# Patient Record
Sex: FEMALE | Race: WHITE | Employment: OTHER | URBAN - METROPOLITAN AREA
[De-identification: names, ages, dates, MRNs, and addresses within clinical notes are randomized per-mention and may not be internally consistent; named-entity substitution may affect disease eponyms.]

---

## 2019-04-16 ENCOUNTER — RECORDS - HEALTHEAST (OUTPATIENT)
Dept: LAB | Facility: CLINIC | Age: 84
End: 2019-04-16

## 2019-04-16 LAB
ALBUMIN SERPL-MCNC: 3 G/DL (ref 3.5–5)
ALP SERPL-CCNC: 76 U/L (ref 45–120)
ALT SERPL W P-5'-P-CCNC: <9 U/L (ref 0–45)
ANION GAP SERPL CALCULATED.3IONS-SCNC: 8 MMOL/L (ref 5–18)
AST SERPL W P-5'-P-CCNC: 14 U/L (ref 0–40)
BASOPHILS # BLD AUTO: 0.1 THOU/UL (ref 0–0.2)
BASOPHILS NFR BLD AUTO: 1 % (ref 0–2)
BILIRUB SERPL-MCNC: 0.3 MG/DL (ref 0–1)
BUN SERPL-MCNC: 93 MG/DL (ref 8–28)
CALCIUM SERPL-MCNC: 9.5 MG/DL (ref 8.5–10.5)
CHLORIDE BLD-SCNC: 105 MMOL/L (ref 98–107)
CO2 SERPL-SCNC: 24 MMOL/L (ref 22–31)
CREAT SERPL-MCNC: 2.72 MG/DL (ref 0.6–1.1)
EOSINOPHIL # BLD AUTO: 0.2 THOU/UL (ref 0–0.4)
EOSINOPHIL NFR BLD AUTO: 3 % (ref 0–6)
ERYTHROCYTE [DISTWIDTH] IN BLOOD BY AUTOMATED COUNT: 14.6 % (ref 11–14.5)
GFR SERPL CREATININE-BSD FRML MDRD: 16 ML/MIN/1.73M2
GLUCOSE BLD-MCNC: 113 MG/DL (ref 70–125)
HCT VFR BLD AUTO: 33.3 % (ref 35–47)
HGB BLD-MCNC: 10.3 G/DL (ref 12–16)
LYMPHOCYTES # BLD AUTO: 1.1 THOU/UL (ref 0.8–4.4)
LYMPHOCYTES NFR BLD AUTO: 12 % (ref 20–40)
MCH RBC QN AUTO: 29.8 PG (ref 27–34)
MCHC RBC AUTO-ENTMCNC: 30.9 G/DL (ref 32–36)
MCV RBC AUTO: 96 FL (ref 80–100)
MONOCYTES # BLD AUTO: 1 THOU/UL (ref 0–0.9)
MONOCYTES NFR BLD AUTO: 10 % (ref 2–10)
NEUTROPHILS # BLD AUTO: 6.9 THOU/UL (ref 2–7.7)
NEUTROPHILS NFR BLD AUTO: 75 % (ref 50–70)
PLATELET # BLD AUTO: 173 THOU/UL (ref 140–440)
PMV BLD AUTO: 12.5 FL (ref 8.5–12.5)
POTASSIUM BLD-SCNC: 5.3 MMOL/L (ref 3.5–5)
PROT SERPL-MCNC: 5.9 G/DL (ref 6–8)
RBC # BLD AUTO: 3.46 MILL/UL (ref 3.8–5.4)
SODIUM SERPL-SCNC: 137 MMOL/L (ref 136–145)
TSH SERPL DL<=0.005 MIU/L-ACNC: 1.45 UIU/ML (ref 0.3–5)
WBC: 9.4 THOU/UL (ref 4–11)

## 2019-04-18 ENCOUNTER — RECORDS - HEALTHEAST (OUTPATIENT)
Dept: LAB | Facility: CLINIC | Age: 84
End: 2019-04-18

## 2019-04-18 LAB
ALBUMIN UR-MCNC: NEGATIVE MG/DL
APPEARANCE UR: ABNORMAL
BACTERIA #/AREA URNS HPF: ABNORMAL HPF
BILIRUB UR QL STRIP: NEGATIVE
COLOR UR AUTO: YELLOW
GLUCOSE UR STRIP-MCNC: NEGATIVE MG/DL
HGB UR QL STRIP: ABNORMAL
KETONES UR STRIP-MCNC: NEGATIVE MG/DL
LEUKOCYTE ESTERASE UR QL STRIP: ABNORMAL
MUCOUS THREADS #/AREA URNS LPF: ABNORMAL LPF
NITRATE UR QL: NEGATIVE
PH UR STRIP: 5.5 [PH] (ref 4.5–8)
RBC #/AREA URNS AUTO: ABNORMAL HPF
SP GR UR STRIP: 1.01 (ref 1–1.03)
SQUAMOUS #/AREA URNS AUTO: >100 LPF
UROBILINOGEN UR STRIP-ACNC: ABNORMAL
WBC #/AREA URNS AUTO: >100 HPF
WBC CLUMPS #/AREA URNS HPF: PRESENT /[HPF]

## 2019-04-21 LAB — BACTERIA SPEC CULT: ABNORMAL

## 2019-04-23 ENCOUNTER — RECORDS - HEALTHEAST (OUTPATIENT)
Dept: LAB | Facility: CLINIC | Age: 84
End: 2019-04-23

## 2019-04-23 LAB
ANION GAP SERPL CALCULATED.3IONS-SCNC: 10 MMOL/L (ref 5–18)
BUN SERPL-MCNC: 44 MG/DL (ref 8–28)
CALCIUM SERPL-MCNC: 9.3 MG/DL (ref 8.5–10.5)
CHLORIDE BLD-SCNC: 103 MMOL/L (ref 98–107)
CO2 SERPL-SCNC: 23 MMOL/L (ref 22–31)
CREAT SERPL-MCNC: 1.68 MG/DL (ref 0.6–1.1)
GFR SERPL CREATININE-BSD FRML MDRD: 28 ML/MIN/1.73M2
GLUCOSE BLD-MCNC: 166 MG/DL (ref 70–125)
POTASSIUM BLD-SCNC: 4 MMOL/L (ref 3.5–5)
SODIUM SERPL-SCNC: 136 MMOL/L (ref 136–145)

## 2019-04-24 ENCOUNTER — APPOINTMENT (OUTPATIENT)
Dept: GENERAL RADIOLOGY | Facility: CLINIC | Age: 84
DRG: 190 | End: 2019-04-24
Attending: EMERGENCY MEDICINE
Payer: MEDICARE

## 2019-04-24 ENCOUNTER — HOSPITAL ENCOUNTER (INPATIENT)
Facility: CLINIC | Age: 84
LOS: 4 days | Discharge: NURSING, REHAB OR LONG-TERM CARE | DRG: 190 | End: 2019-04-28
Attending: EMERGENCY MEDICINE | Admitting: INTERNAL MEDICINE
Payer: MEDICARE

## 2019-04-24 DIAGNOSIS — I48.20 CHRONIC ATRIAL FIBRILLATION (H): ICD-10-CM

## 2019-04-24 DIAGNOSIS — J18.9 PNEUMONIA DUE TO INFECTIOUS ORGANISM, UNSPECIFIED LATERALITY, UNSPECIFIED PART OF LUNG: ICD-10-CM

## 2019-04-24 DIAGNOSIS — J45.901 EXACERBATION OF ASTHMA, UNSPECIFIED ASTHMA SEVERITY, UNSPECIFIED WHETHER PERSISTENT: Primary | ICD-10-CM

## 2019-04-24 DIAGNOSIS — J18.9 COMMUNITY ACQUIRED PNEUMONIA, UNSPECIFIED LATERALITY: ICD-10-CM

## 2019-04-24 LAB
ALBUMIN SERPL-MCNC: 3 G/DL (ref 3.4–5)
ALP SERPL-CCNC: 121 U/L (ref 40–150)
ALT SERPL W P-5'-P-CCNC: 42 U/L (ref 0–50)
ANION GAP SERPL CALCULATED.3IONS-SCNC: 11 MMOL/L (ref 3–14)
AST SERPL W P-5'-P-CCNC: 43 U/L (ref 0–45)
BASOPHILS # BLD AUTO: 0 10E9/L (ref 0–0.2)
BASOPHILS NFR BLD AUTO: 0.1 %
BILIRUB SERPL-MCNC: 0.7 MG/DL (ref 0.2–1.3)
BUN SERPL-MCNC: 43 MG/DL (ref 7–30)
CALCIUM SERPL-MCNC: 8.9 MG/DL (ref 8.5–10.1)
CHLORIDE SERPL-SCNC: 104 MMOL/L (ref 94–109)
CO2 SERPL-SCNC: 22 MMOL/L (ref 20–32)
CREAT SERPL-MCNC: 1.62 MG/DL (ref 0.52–1.04)
DIFFERENTIAL METHOD BLD: ABNORMAL
EOSINOPHIL # BLD AUTO: 0 10E9/L (ref 0–0.7)
EOSINOPHIL NFR BLD AUTO: 0.4 %
ERYTHROCYTE [DISTWIDTH] IN BLOOD BY AUTOMATED COUNT: 14.9 % (ref 10–15)
GFR SERPL CREATININE-BSD FRML MDRD: 27 ML/MIN/{1.73_M2}
GLUCOSE SERPL-MCNC: 135 MG/DL (ref 70–99)
HCT VFR BLD AUTO: 35.1 % (ref 35–47)
HGB BLD-MCNC: 11.5 G/DL (ref 11.7–15.7)
IMM GRANULOCYTES # BLD: 0.1 10E9/L (ref 0–0.4)
IMM GRANULOCYTES NFR BLD: 0.6 %
LYMPHOCYTES # BLD AUTO: 0.7 10E9/L (ref 0.8–5.3)
LYMPHOCYTES NFR BLD AUTO: 6.1 %
MCH RBC QN AUTO: 30 PG (ref 26.5–33)
MCHC RBC AUTO-ENTMCNC: 32.8 G/DL (ref 31.5–36.5)
MCV RBC AUTO: 92 FL (ref 78–100)
MONOCYTES # BLD AUTO: 1.4 10E9/L (ref 0–1.3)
MONOCYTES NFR BLD AUTO: 12.4 %
NEUTROPHILS # BLD AUTO: 9.2 10E9/L (ref 1.6–8.3)
NEUTROPHILS NFR BLD AUTO: 80.4 %
NRBC # BLD AUTO: 0 10*3/UL
NRBC BLD AUTO-RTO: 0 /100
PLATELET # BLD AUTO: 227 10E9/L (ref 150–450)
POTASSIUM SERPL-SCNC: 4 MMOL/L (ref 3.4–5.3)
PROT SERPL-MCNC: 7.2 G/DL (ref 6.8–8.8)
RBC # BLD AUTO: 3.83 10E12/L (ref 3.8–5.2)
SODIUM SERPL-SCNC: 137 MMOL/L (ref 133–144)
TROPONIN I SERPL-MCNC: 0.02 UG/L (ref 0–0.04)
WBC # BLD AUTO: 11.4 10E9/L (ref 4–11)

## 2019-04-24 PROCEDURE — 94640 AIRWAY INHALATION TREATMENT: CPT

## 2019-04-24 PROCEDURE — 40000275 ZZH STATISTIC RCP TIME EA 10 MIN

## 2019-04-24 PROCEDURE — 99285 EMERGENCY DEPT VISIT HI MDM: CPT | Mod: 25

## 2019-04-24 PROCEDURE — 71046 X-RAY EXAM CHEST 2 VIEWS: CPT

## 2019-04-24 PROCEDURE — 25000125 ZZHC RX 250: Performed by: EMERGENCY MEDICINE

## 2019-04-24 PROCEDURE — 87040 BLOOD CULTURE FOR BACTERIA: CPT | Performed by: EMERGENCY MEDICINE

## 2019-04-24 PROCEDURE — 85025 COMPLETE CBC W/AUTO DIFF WBC: CPT | Performed by: EMERGENCY MEDICINE

## 2019-04-24 PROCEDURE — 96361 HYDRATE IV INFUSION ADD-ON: CPT

## 2019-04-24 PROCEDURE — 36415 COLL VENOUS BLD VENIPUNCTURE: CPT

## 2019-04-24 PROCEDURE — 96365 THER/PROPH/DIAG IV INF INIT: CPT

## 2019-04-24 PROCEDURE — 12000000 ZZH R&B MED SURG/OB

## 2019-04-24 PROCEDURE — 99223 1ST HOSP IP/OBS HIGH 75: CPT | Mod: AI | Performed by: INTERNAL MEDICINE

## 2019-04-24 PROCEDURE — 84484 ASSAY OF TROPONIN QUANT: CPT | Performed by: EMERGENCY MEDICINE

## 2019-04-24 PROCEDURE — A9270 NON-COVERED ITEM OR SERVICE: HCPCS | Performed by: INTERNAL MEDICINE

## 2019-04-24 PROCEDURE — 80053 COMPREHEN METABOLIC PANEL: CPT | Performed by: EMERGENCY MEDICINE

## 2019-04-24 PROCEDURE — 25000132 ZZH RX MED GY IP 250 OP 250 PS 637: Performed by: INTERNAL MEDICINE

## 2019-04-24 PROCEDURE — 25000128 H RX IP 250 OP 636: Performed by: EMERGENCY MEDICINE

## 2019-04-24 PROCEDURE — 25000128 H RX IP 250 OP 636: Performed by: INTERNAL MEDICINE

## 2019-04-24 PROCEDURE — 94640 AIRWAY INHALATION TREATMENT: CPT | Mod: 76

## 2019-04-24 PROCEDURE — 25000125 ZZHC RX 250: Performed by: INTERNAL MEDICINE

## 2019-04-24 RX ORDER — FUROSEMIDE 20 MG
20 TABLET ORAL
Status: ON HOLD | COMMUNITY
End: 2019-04-28

## 2019-04-24 RX ORDER — LATANOPROST 50 UG/ML
1 SOLUTION/ DROPS OPHTHALMIC AT BEDTIME
Status: DISCONTINUED | OUTPATIENT
Start: 2019-04-24 | End: 2019-04-28 | Stop reason: HOSPADM

## 2019-04-24 RX ORDER — ALBUTEROL SULFATE 90 UG/1
2 AEROSOL, METERED RESPIRATORY (INHALATION)
COMMUNITY

## 2019-04-24 RX ORDER — LOVASTATIN 20 MG
20 TABLET ORAL EVERY EVENING
COMMUNITY

## 2019-04-24 RX ORDER — ALBUTEROL SULFATE 0.83 MG/ML
2.5 SOLUTION RESPIRATORY (INHALATION) EVERY 6 HOURS PRN
COMMUNITY
End: 2019-04-24

## 2019-04-24 RX ORDER — NALOXONE HYDROCHLORIDE 0.4 MG/ML
.1-.4 INJECTION, SOLUTION INTRAMUSCULAR; INTRAVENOUS; SUBCUTANEOUS
Status: DISCONTINUED | OUTPATIENT
Start: 2019-04-24 | End: 2019-04-28 | Stop reason: HOSPADM

## 2019-04-24 RX ORDER — IPRATROPIUM BROMIDE AND ALBUTEROL SULFATE 2.5; .5 MG/3ML; MG/3ML
3 SOLUTION RESPIRATORY (INHALATION) ONCE
Status: COMPLETED | OUTPATIENT
Start: 2019-04-24 | End: 2019-04-24

## 2019-04-24 RX ORDER — BISACODYL 10 MG
10 SUPPOSITORY, RECTAL RECTAL DAILY PRN
Status: DISCONTINUED | OUTPATIENT
Start: 2019-04-24 | End: 2019-04-28 | Stop reason: HOSPADM

## 2019-04-24 RX ORDER — AMOXICILLIN 250 MG
1 CAPSULE ORAL 2 TIMES DAILY PRN
Status: DISCONTINUED | OUTPATIENT
Start: 2019-04-24 | End: 2019-04-28 | Stop reason: HOSPADM

## 2019-04-24 RX ORDER — ALBUTEROL SULFATE 90 UG/1
2 AEROSOL, METERED RESPIRATORY (INHALATION)
Status: DISCONTINUED | OUTPATIENT
Start: 2019-04-24 | End: 2019-04-28 | Stop reason: HOSPADM

## 2019-04-24 RX ORDER — ESCITALOPRAM OXALATE 5 MG/1
5 TABLET ORAL DAILY
COMMUNITY

## 2019-04-24 RX ORDER — LISINOPRIL 10 MG/1
10 TABLET ORAL DAILY
COMMUNITY
End: 2019-04-24

## 2019-04-24 RX ORDER — ACETAMINOPHEN 325 MG/1
650 TABLET ORAL EVERY 4 HOURS PRN
COMMUNITY

## 2019-04-24 RX ORDER — DILTIAZEM HYDROCHLORIDE 120 MG/1
120 CAPSULE, EXTENDED RELEASE ORAL DAILY
Status: DISCONTINUED | OUTPATIENT
Start: 2019-04-25 | End: 2019-04-28 | Stop reason: HOSPADM

## 2019-04-24 RX ORDER — ACETAMINOPHEN 325 MG/1
650 TABLET ORAL EVERY 4 HOURS PRN
Status: DISCONTINUED | OUTPATIENT
Start: 2019-04-24 | End: 2019-04-24

## 2019-04-24 RX ORDER — ESCITALOPRAM OXALATE 5 MG/1
5 TABLET ORAL EVERY EVENING
Status: DISCONTINUED | OUTPATIENT
Start: 2019-04-24 | End: 2019-04-28 | Stop reason: HOSPADM

## 2019-04-24 RX ORDER — AZITHROMYCIN 250 MG/1
250 TABLET, FILM COATED ORAL DAILY
Status: COMPLETED | OUTPATIENT
Start: 2019-04-25 | End: 2019-04-28

## 2019-04-24 RX ORDER — AMOXICILLIN 250 MG
2 CAPSULE ORAL 2 TIMES DAILY PRN
Status: DISCONTINUED | OUTPATIENT
Start: 2019-04-24 | End: 2019-04-28 | Stop reason: HOSPADM

## 2019-04-24 RX ORDER — POLYETHYLENE GLYCOL 3350 17 G/17G
17 POWDER, FOR SOLUTION ORAL DAILY PRN
Status: DISCONTINUED | OUTPATIENT
Start: 2019-04-24 | End: 2019-04-28 | Stop reason: HOSPADM

## 2019-04-24 RX ORDER — IPRATROPIUM BROMIDE AND ALBUTEROL SULFATE 2.5; .5 MG/3ML; MG/3ML
3 SOLUTION RESPIRATORY (INHALATION)
Status: DISCONTINUED | OUTPATIENT
Start: 2019-04-24 | End: 2019-04-28 | Stop reason: HOSPADM

## 2019-04-24 RX ORDER — ALLOPURINOL 100 MG/1
100 TABLET ORAL DAILY
COMMUNITY

## 2019-04-24 RX ORDER — AZITHROMYCIN 500 MG/1
500 INJECTION, POWDER, LYOPHILIZED, FOR SOLUTION INTRAVENOUS ONCE
Status: COMPLETED | OUTPATIENT
Start: 2019-04-24 | End: 2019-04-24

## 2019-04-24 RX ORDER — LOVASTATIN 20 MG
20 TABLET ORAL EVERY EVENING
Status: DISCONTINUED | OUTPATIENT
Start: 2019-04-24 | End: 2019-04-24

## 2019-04-24 RX ORDER — ONDANSETRON 2 MG/ML
4 INJECTION INTRAMUSCULAR; INTRAVENOUS EVERY 6 HOURS PRN
Status: DISCONTINUED | OUTPATIENT
Start: 2019-04-24 | End: 2019-04-28 | Stop reason: HOSPADM

## 2019-04-24 RX ORDER — SIMVASTATIN 10 MG
10 TABLET ORAL AT BEDTIME
Status: DISCONTINUED | OUTPATIENT
Start: 2019-04-24 | End: 2019-04-28 | Stop reason: HOSPADM

## 2019-04-24 RX ORDER — CEFTRIAXONE 2 G/1
2 INJECTION, POWDER, FOR SOLUTION INTRAMUSCULAR; INTRAVENOUS ONCE
Status: COMPLETED | OUTPATIENT
Start: 2019-04-24 | End: 2019-04-24

## 2019-04-24 RX ORDER — CEFTRIAXONE 1 G/1
1 INJECTION, POWDER, FOR SOLUTION INTRAMUSCULAR; INTRAVENOUS EVERY 24 HOURS
Status: DISCONTINUED | OUTPATIENT
Start: 2019-04-25 | End: 2019-04-26

## 2019-04-24 RX ORDER — DILTIAZEM HYDROCHLORIDE 120 MG/1
120 CAPSULE, EXTENDED RELEASE ORAL DAILY
COMMUNITY

## 2019-04-24 RX ORDER — LATANOPROST 50 UG/ML
1 SOLUTION/ DROPS OPHTHALMIC AT BEDTIME
COMMUNITY

## 2019-04-24 RX ORDER — ACETAMINOPHEN 325 MG/1
650 TABLET ORAL EVERY 4 HOURS PRN
Status: DISCONTINUED | OUTPATIENT
Start: 2019-04-24 | End: 2019-04-28 | Stop reason: HOSPADM

## 2019-04-24 RX ORDER — ONDANSETRON 4 MG/1
4 TABLET, ORALLY DISINTEGRATING ORAL EVERY 6 HOURS PRN
Status: DISCONTINUED | OUTPATIENT
Start: 2019-04-24 | End: 2019-04-28 | Stop reason: HOSPADM

## 2019-04-24 RX ORDER — ALLOPURINOL 100 MG/1
100 TABLET ORAL DAILY
Status: DISCONTINUED | OUTPATIENT
Start: 2019-04-25 | End: 2019-04-28 | Stop reason: HOSPADM

## 2019-04-24 RX ORDER — METHYLPREDNISOLONE SODIUM SUCCINATE 40 MG/ML
15 INJECTION, POWDER, LYOPHILIZED, FOR SOLUTION INTRAMUSCULAR; INTRAVENOUS 2 TIMES DAILY
Status: DISCONTINUED | OUTPATIENT
Start: 2019-04-24 | End: 2019-04-25

## 2019-04-24 RX ADMIN — METHYLPREDNISOLONE SODIUM SUCCINATE 15 MG: 40 INJECTION, POWDER, FOR SOLUTION INTRAMUSCULAR; INTRAVENOUS at 21:03

## 2019-04-24 RX ADMIN — AZITHROMYCIN DIHYDRATE 500 MG: 500 INJECTION, POWDER, LYOPHILIZED, FOR SOLUTION INTRAVENOUS at 13:43

## 2019-04-24 RX ADMIN — SIMVASTATIN 10 MG: 10 TABLET, FILM COATED ORAL at 21:03

## 2019-04-24 RX ADMIN — ALBUTEROL SULFATE 2 PUFF: 90 AEROSOL, METERED RESPIRATORY (INHALATION) at 21:10

## 2019-04-24 RX ADMIN — LATANOPROST 1 DROP: 50 SOLUTION/ DROPS OPHTHALMIC at 21:03

## 2019-04-24 RX ADMIN — CEFTRIAXONE SODIUM 2 G: 2 INJECTION, POWDER, FOR SOLUTION INTRAMUSCULAR; INTRAVENOUS at 13:10

## 2019-04-24 RX ADMIN — IPRATROPIUM BROMIDE AND ALBUTEROL SULFATE 3 ML: .5; 3 SOLUTION RESPIRATORY (INHALATION) at 15:56

## 2019-04-24 RX ADMIN — ESCITALOPRAM 5 MG: 5 TABLET, FILM COATED ORAL at 21:03

## 2019-04-24 RX ADMIN — METHYLPREDNISOLONE SODIUM SUCCINATE 15 MG: 40 INJECTION, POWDER, FOR SOLUTION INTRAMUSCULAR; INTRAVENOUS at 16:09

## 2019-04-24 RX ADMIN — SODIUM CHLORIDE 1000 ML: 9 INJECTION, SOLUTION INTRAVENOUS at 11:21

## 2019-04-24 RX ADMIN — IPRATROPIUM BROMIDE AND ALBUTEROL SULFATE 3 ML: .5; 2.5 SOLUTION RESPIRATORY (INHALATION) at 10:29

## 2019-04-24 ASSESSMENT — ACTIVITIES OF DAILY LIVING (ADL)
ADLS_ACUITY_SCORE: 16
AMBULATION: 1-->ASSISTIVE EQUIPMENT
BATHING: 2-->ASSISTIVE PERSON
TOILETING: 2-->ASSISTIVE PERSON
DRESS: 2-->ASSISTIVE PERSON
RETIRED_COMMUNICATION: 0-->UNDERSTANDS/COMMUNICATES WITHOUT DIFFICULTY
TRANSFERRING: 1-->ASSISTIVE EQUIPMENT
ADLS_ACUITY_SCORE: 22
RETIRED_EATING: 2-->ASSISTIVE PERSON
SWALLOWING: 0-->SWALLOWS FOODS/LIQUIDS WITHOUT DIFFICULTY
FALL_HISTORY_WITHIN_LAST_SIX_MONTHS: NO
COGNITION: 1 - ATTENTION OR MEMORY DEFICITS

## 2019-04-24 ASSESSMENT — MIFFLIN-ST. JEOR: SCORE: 1063.13

## 2019-04-24 ASSESSMENT — ENCOUNTER SYMPTOMS: SHORTNESS OF BREATH: 1

## 2019-04-24 NOTE — ED NOTES
Bed: ED20  Expected date:   Expected time:   Means of arrival:   Comments:  E2  89 F sob/asthma  0951

## 2019-04-24 NOTE — ED NOTES
"[facility]  ED Nurse Handoff Report    ED Chief complaint: Shortness of Breath (sob for several days)      ED Diagnosis:   Final diagnoses:   Pneumonia due to infectious organism, unspecified laterality, unspecified part of lung       Code Status: DNR / DNI    Allergies:   Allergies   Allergen Reactions     Amoxicillin        Activity level - Baseline/Home:  Stand with Assist    Activity Level - Current:   Stand with Assist     Needed?: No    Isolation: No  Infection: Not Applicable  Bariatric?: No    Vital Signs:   Vitals:    04/24/19 1017 04/24/19 1030 04/24/19 1115   BP: 185/81 177/72 144/67   Pulse: 64 60 66   Resp: 22 20 20   Temp: 97.9  F (36.6  C)     TempSrc: Oral     SpO2: 94% 93% 91%   Weight: 69.4 kg (153 lb)     Height: 1.6 m (5' 3\")         Cardiac Rhythm: ,        Pain level: 0-10 Pain Scale: 0    Is this patient confused?: No   Does this patient have a guardian?  Yes         If yes, is there guardianship documents in the Epic \"Code/ACP\" activity?  no         Guardian Notified?  yes  [place] - Suicide Severity Rating Scale Completed?  Yes  If yes, what color did the patient score?  White    Patient Report: Initial Complaint: pt with sob for several days lives at an assisted living   Focused Assessment: pt with audible ispiratory and expiratory wheezes had one neb rx by ems and one by us.  With sl decrease in sob   Tests Performed: labs chest x ray  Abnormal Results: wbc 11.7 chest x ray shows consolidation  Treatments provided: liter of fluids    Family Comments: grandson here pt just moved here 2 weeks ago from [place].  Daughter that is decision maker is in [place]     OBS brochure/video discussed/provided to patient/family: N/A              Name of person given brochure if not patient:              Relationship to patient:     ED Medications:   Medications   0.9% sodium chloride BOLUS (1,000 mLs Intravenous New Bag 4/24/19 1121)   ipratropium - albuterol 0.5 mg/2.5 " mg/3 mL (DUONEB) neb solution 3 mL (3 mLs Nebulization Given 4/24/19 1029)       Drips infusing?:  Yes    For the majority of the shift this patient was Green.   Interventions performed were rounds and support.    Severe Sepsis OR Septic Shock Diagnosis Present: No    To be done/followed up on inpatient unit:      ED NURSE PHONE NUMBER: [phone]

## 2019-04-24 NOTE — PROGRESS NOTES
RECEIVING UNIT ED HANDOFF REVIEW    ED Nurse Handoff Report was reviewed by: Belle Ramirez on April 24, 2019 at 2:02 PM

## 2019-04-24 NOTE — PHARMACY-ADMISSION MEDICATION HISTORY
Admission medication history interview status for the 4/24/2019  admission is complete. See EPIC admission navigator for prior to admission medications     Medication history source reliability:Good    Actions taken by pharmacist (provider contacted, etc): Utilized MAR from [facility]     Additional medication history information not noted on PTA med list :None    Medication reconciliation/reorder completed by provider prior to medication history? No    Time spent in this activity: 20 minutes    Prior to Admission medications    Medication Sig Last Dose Taking? Auth Provider   acetaminophen (TYLENOL) 325 MG tablet Take 650 mg by mouth every 4 hours as needed for mild pain prn Yes Unknown, Entered By History   albuterol (PROAIR HFA/PROVENTIL HFA/VENTOLIN HFA) 108 (90 Base) MCG/ACT inhaler Inhale 2 puffs into the lungs every 2 hours as needed for shortness of breath / dyspnea or wheezing prn Yes Unknown, Entered By History   allopurinol (ZYLOPRIM) 100 MG tablet Take 100 mg by mouth daily 0730 4/24/2019 at 0730 Yes Reported, Patient   diltiazem ER (DILT-XR) 120 MG 24 hr capsule Take 120 mg by mouth daily 0800 4/24/2019 at 0800 Yes Reported, Patient   escitalopram (LEXAPRO) 5 MG tablet Take 5 mg by mouth daily 2000 4/23/2019 at 2000 Yes Reported, Patient   fluticasone (FLOVENT DISKUS) 50 MCG/BLIST inhaler Inhale 1 puff into the lungs every 12 hours 0800, 2000 4/24/2019 at 0800 Yes Unknown, Entered By History   furosemide (LASIX) 20 MG tablet Take 20 mg by mouth Every Mon, Wed, Fri Morning 0800 4/24/2019 at 0800 Yes Reported, Patient   latanoprost (XALATAN) 0.005 % ophthalmic solution Place 1 drop into both eyes At Bedtime 2000 4/23/2019 at 2000 Yes Unknown, Entered By History   lovastatin (MEVACOR) 20 MG tablet Take 20 mg by mouth every evening 1500 4/23/2019 at 1500 Yes Reported, Patient   rivaroxaban ANTICOAGULANT (XARELTO) 15 MG TABS tablet Take 15 mg by mouth daily 0800 4/24/2019 at 0800 Yes Reported,  Patient

## 2019-04-24 NOTE — ED PROVIDER NOTES
History     Chief Complaint:  Shortness of Breath     History limited as records are not available and family is unsure of history.    DARRON Solis is a 89 year old female with a history of asthma who presents to the emergency department for evaluation of shortness of breath. Patient reports that she has been increasingly short of breath for the last 2-3 weeks. Per grandson, patient's staff called her daughter and were worried about her vitals. Patient's daughter told them to send her to the hospital. Unclear what her vitals were. Per grandson, patient is supposed to use the inhaler. Her last home helped her to administer this (this was in [place]), but her current residence does not do this. Grandson thinks that she has not been using the inhaler, and they are speaking her care facility about this. EMS provided her with a neb treatment en route, but did not think that it helped the patient.        Allergies:  Amoxicillin      Medications:    Albuterol neb solution   Allopurinol   Diltiazem ER  Lexapro  Lasix   Lisinopril   Lovastatin   Xarelto     Past Medical History:    Asthma   Atrial Fibrillation   Chronic Kidney Disease   Depressive disorder  Gout  Heart failure  Pacemaker   Pulmonary embolism   Preglaucoma     Past Surgical History:    History reviewed. No pertinent past surgical history.     Family History:    History reviewed. No pertinent family history.      Social History:  The patient was accompanied to the ED by EMS.  Smoking Status: Never  Smokeless Tobacco: Never   Alcohol Use: Not currently   Marital Status:       Review of Systems   Respiratory: Positive for shortness of breath.    All other systems reviewed and are negative.        Physical Exam     Patient Vitals for the past 24 hrs:   BP Temp Temp src Pulse Resp SpO2 Height Weight   04/24/19 1115 144/67 -- -- 66 20 91 % -- --   04/24/19 1030 177/72 -- -- 60 20 93 % -- --   04/24/19 1017 185/81 97.9  F (36.6  C) Oral 64 22 94 %  "1.6 m (5' 3\") 69.4 kg (153 lb)      Physical Exam  Vitals: reviewed by me  General: Pt seen on hospital [facility], pleasant, cooperative, and alert to conversation.  Frail-appearing, mildly demented.  Eyes: Tracking well, clear conjunctiva BL  ENT: MMM, midline trachea.   Lungs: Moderate respiratory distress, and expiratory wheezing audible without stethoscope, mild tachypnea, mild accessory muscle use.  CV: Rate as above, regular rhythm.    Abd: Soft, non tender, no guarding, no rebound. Non distended  MSK: no peripheral edema or joint effusion.  No evidence of trauma  Skin: No rash, normal turgor and temperature  Neuro: Clear speech and no facial droop.  Psych: Not RIS, no e/o AH/VH    Emergency Department Course     Imaging:  Radiology findings were communicated with the patient and family who voiced understanding of the findings.    XR Chest 2 views:  IMPRESSION: Enlarged cardiac silhouette. Likely trace LEFT pleural  effusion. There is also LEFT basilar atelectasis/consolidation and  airspace opacity in the RIGHT mid to upper lung. No pneumothorax seen  on either side.  Report per radiology     Laboratory:  Laboratory findings were communicated with the patient and family who voiced understanding of the findings.    CBC: WBC 11.4 (H), HGB 11.5 (L) o/w WNL. ()   CMP: Glucose 135 (H), Urea Nitrogen 43 (H), Creatinine 1.62 (H), GFR Estimate 27 (L), Albumin 3.0 (L) o/w WNL     Troponin (Collected 1024): 0.019  Blood cultures: Pending  Blood cultures: Pending    Interventions:  1029 - DuoNeb 3mL Nebulization    1121 - NS Bolus 1,000mL IV   1310 - Rocephin 2g IV   1343 - Azithromycin 500mg IV      Emergency Department Course:  Nursing notes and vitals reviewed.  The patient was sent for a CXR while in the emergency department, results above.   IV was inserted and blood was drawn for laboratory testing, results above.    1015: I performed an exam of the patient as documented above.     1224: I spoke with " Shilpa of the Hospitalist service regarding patient's presentation, findings, and plan of care.    1226: Patient rechecked and updated.      Findings and plan explained to the Patient and grandson who consents to admission. Discussed the patient with Dr. Massey, who will admit the patient to a inpatient medical bed for further monitoring, evaluation, and treatment.    I personally reviewed the laboratory and imaging results with the Patient and grandson and answered all related questions prior to admission.    Impression & Plan      Medical Decision Making:  Swati Solis is a 89 year old female who presents to the emergency department today with shortness of breath for several weeks, found to be hypoxic at her nursing home today. Here in the emergency department, she does have evidence of a pneumonia, and also has a white count on her labs. I do think that she would benefit from treatment, as she also has new increasing oxygen needs. She is protecting her airway here, but have mild respiratory distress. Doing okay here with standard treatment, will give antibiotics and admit to a monitored setting given her age. Unclear code status at this time, cody at bedside is not aware but is exploring with her POA. Will also note that the patient has what appears to be GABINO, no baseline known. Maybe chronic, but I do suspect that she is also dehydrated, which is an additional reason to have her be admitted. Will continue with IV fluids and monitoring, and admit under the care of Dr. Massey who very generously accepts care of the patient.     Diagnosis:    ICD-10-CM   1. Pneumonia due to infectious organism, unspecified laterality, unspecified part of lung J18.9       Disposition:  Admitted to inpatient medical bed      Scribe Disclosure:  Felisa GARCIA, am serving as a scribe at 10:08 AM on 4/24/2019 to document services personally performed by Scott Manjarrez MD based on my observations and the provider's  statements to me.   4/24/2019    EMERGENCY DEPARTMENT       Scott Manjarrez MD  04/24/19 1615

## 2019-04-25 ENCOUNTER — APPOINTMENT (OUTPATIENT)
Dept: PHYSICAL THERAPY | Facility: CLINIC | Age: 84
DRG: 190 | End: 2019-04-25
Attending: INTERNAL MEDICINE
Payer: MEDICARE

## 2019-04-25 ENCOUNTER — APPOINTMENT (OUTPATIENT)
Dept: GENERAL RADIOLOGY | Facility: CLINIC | Age: 84
DRG: 190 | End: 2019-04-25
Attending: INTERNAL MEDICINE
Payer: MEDICARE

## 2019-04-25 ENCOUNTER — APPOINTMENT (OUTPATIENT)
Dept: OCCUPATIONAL THERAPY | Facility: CLINIC | Age: 84
DRG: 190 | End: 2019-04-25
Attending: INTERNAL MEDICINE
Payer: MEDICARE

## 2019-04-25 LAB
ALBUMIN SERPL-MCNC: 2.7 G/DL (ref 3.4–5)
ALBUMIN UR-MCNC: 10 MG/DL
ALP SERPL-CCNC: 124 U/L (ref 40–150)
ALT SERPL W P-5'-P-CCNC: 51 U/L (ref 0–50)
ANION GAP SERPL CALCULATED.3IONS-SCNC: 10 MMOL/L (ref 3–14)
APPEARANCE UR: CLEAR
AST SERPL W P-5'-P-CCNC: 46 U/L (ref 0–45)
BACTERIA #/AREA URNS HPF: ABNORMAL /HPF
BILIRUB DIRECT SERPL-MCNC: 0.1 MG/DL (ref 0–0.2)
BILIRUB SERPL-MCNC: 0.4 MG/DL (ref 0.2–1.3)
BILIRUB UR QL STRIP: NEGATIVE
BUN SERPL-MCNC: 43 MG/DL (ref 7–30)
CALCIUM SERPL-MCNC: 8.7 MG/DL (ref 8.5–10.1)
CHLORIDE SERPL-SCNC: 109 MMOL/L (ref 94–109)
CO2 SERPL-SCNC: 21 MMOL/L (ref 20–32)
COLOR UR AUTO: YELLOW
CREAT SERPL-MCNC: 1.23 MG/DL (ref 0.52–1.04)
ERYTHROCYTE [DISTWIDTH] IN BLOOD BY AUTOMATED COUNT: 14.7 % (ref 10–15)
GFR SERPL CREATININE-BSD FRML MDRD: 37 ML/MIN/{1.73_M2}
GLUCOSE SERPL-MCNC: 173 MG/DL (ref 70–99)
GLUCOSE UR STRIP-MCNC: NEGATIVE MG/DL
HCT VFR BLD AUTO: 34.4 % (ref 35–47)
HGB BLD-MCNC: 11.4 G/DL (ref 11.7–15.7)
HGB UR QL STRIP: ABNORMAL
KETONES UR STRIP-MCNC: NEGATIVE MG/DL
LEUKOCYTE ESTERASE UR QL STRIP: NEGATIVE
MCH RBC QN AUTO: 30.2 PG (ref 26.5–33)
MCHC RBC AUTO-ENTMCNC: 33.1 G/DL (ref 31.5–36.5)
MCV RBC AUTO: 91 FL (ref 78–100)
MUCOUS THREADS #/AREA URNS LPF: PRESENT /LPF
NITRATE UR QL: NEGATIVE
NT-PROBNP SERPL-MCNC: ABNORMAL PG/ML (ref 0–1800)
PH UR STRIP: 5 PH (ref 5–7)
PLATELET # BLD AUTO: 214 10E9/L (ref 150–450)
POTASSIUM SERPL-SCNC: 4.2 MMOL/L (ref 3.4–5.3)
PROCALCITONIN SERPL-MCNC: 0.12 NG/ML
PROT SERPL-MCNC: 6.8 G/DL (ref 6.8–8.8)
RBC # BLD AUTO: 3.78 10E12/L (ref 3.8–5.2)
RBC #/AREA URNS AUTO: 6 /HPF (ref 0–2)
SODIUM SERPL-SCNC: 140 MMOL/L (ref 133–144)
SOURCE: ABNORMAL
SP GR UR STRIP: 1.01 (ref 1–1.03)
TROPONIN I SERPL-MCNC: <0.015 UG/L (ref 0–0.04)
TROPONIN I SERPL-MCNC: <0.015 UG/L (ref 0–0.04)
UROBILINOGEN UR STRIP-MCNC: NORMAL MG/DL (ref 0–2)
WBC # BLD AUTO: 6.3 10E9/L (ref 4–11)
WBC #/AREA URNS AUTO: 4 /HPF (ref 0–5)

## 2019-04-25 PROCEDURE — 36415 COLL VENOUS BLD VENIPUNCTURE: CPT | Performed by: HOSPITALIST

## 2019-04-25 PROCEDURE — 97530 THERAPEUTIC ACTIVITIES: CPT | Mod: GO

## 2019-04-25 PROCEDURE — 93005 ELECTROCARDIOGRAM TRACING: CPT

## 2019-04-25 PROCEDURE — 84484 ASSAY OF TROPONIN QUANT: CPT | Performed by: HOSPITALIST

## 2019-04-25 PROCEDURE — 94640 AIRWAY INHALATION TREATMENT: CPT | Mod: 76

## 2019-04-25 PROCEDURE — 25000128 H RX IP 250 OP 636: Performed by: INTERNAL MEDICINE

## 2019-04-25 PROCEDURE — 36415 COLL VENOUS BLD VENIPUNCTURE: CPT | Performed by: INTERNAL MEDICINE

## 2019-04-25 PROCEDURE — 80076 HEPATIC FUNCTION PANEL: CPT | Performed by: INTERNAL MEDICINE

## 2019-04-25 PROCEDURE — 97161 PT EVAL LOW COMPLEX 20 MIN: CPT | Mod: GP

## 2019-04-25 PROCEDURE — 40000275 ZZH STATISTIC RCP TIME EA 10 MIN

## 2019-04-25 PROCEDURE — 25000132 ZZH RX MED GY IP 250 OP 250 PS 637: Performed by: INTERNAL MEDICINE

## 2019-04-25 PROCEDURE — 12000000 ZZH R&B MED SURG/OB

## 2019-04-25 PROCEDURE — 25000128 H RX IP 250 OP 636: Performed by: HOSPITALIST

## 2019-04-25 PROCEDURE — 97535 SELF CARE MNGMENT TRAINING: CPT | Mod: GO

## 2019-04-25 PROCEDURE — 93010 ELECTROCARDIOGRAM REPORT: CPT | Performed by: INTERNAL MEDICINE

## 2019-04-25 PROCEDURE — 25000125 ZZHC RX 250: Performed by: INTERNAL MEDICINE

## 2019-04-25 PROCEDURE — 80048 BASIC METABOLIC PNL TOTAL CA: CPT | Performed by: INTERNAL MEDICINE

## 2019-04-25 PROCEDURE — 71045 X-RAY EXAM CHEST 1 VIEW: CPT

## 2019-04-25 PROCEDURE — 97530 THERAPEUTIC ACTIVITIES: CPT | Mod: GP

## 2019-04-25 PROCEDURE — A9270 NON-COVERED ITEM OR SERVICE: HCPCS | Performed by: INTERNAL MEDICINE

## 2019-04-25 PROCEDURE — 84145 PROCALCITONIN (PCT): CPT | Performed by: HOSPITALIST

## 2019-04-25 PROCEDURE — 83880 ASSAY OF NATRIURETIC PEPTIDE: CPT | Performed by: HOSPITALIST

## 2019-04-25 PROCEDURE — 99232 SBSQ HOSP IP/OBS MODERATE 35: CPT | Performed by: HOSPITALIST

## 2019-04-25 PROCEDURE — 94640 AIRWAY INHALATION TREATMENT: CPT

## 2019-04-25 PROCEDURE — 85027 COMPLETE CBC AUTOMATED: CPT | Performed by: INTERNAL MEDICINE

## 2019-04-25 PROCEDURE — 81001 URINALYSIS AUTO W/SCOPE: CPT | Performed by: INTERNAL MEDICINE

## 2019-04-25 PROCEDURE — 97165 OT EVAL LOW COMPLEX 30 MIN: CPT | Mod: GO

## 2019-04-25 RX ORDER — METHYLPREDNISOLONE SODIUM SUCCINATE 40 MG/ML
40 INJECTION, POWDER, LYOPHILIZED, FOR SOLUTION INTRAMUSCULAR; INTRAVENOUS EVERY 8 HOURS
Status: DISCONTINUED | OUTPATIENT
Start: 2019-04-25 | End: 2019-04-26

## 2019-04-25 RX ADMIN — IPRATROPIUM BROMIDE AND ALBUTEROL SULFATE 3 ML: .5; 3 SOLUTION RESPIRATORY (INHALATION) at 07:45

## 2019-04-25 RX ADMIN — IPRATROPIUM BROMIDE AND ALBUTEROL SULFATE 3 ML: .5; 3 SOLUTION RESPIRATORY (INHALATION) at 15:39

## 2019-04-25 RX ADMIN — FLUTICASONE FUROATE 1 PUFF: 100 POWDER RESPIRATORY (INHALATION) at 09:33

## 2019-04-25 RX ADMIN — LATANOPROST 1 DROP: 50 SOLUTION/ DROPS OPHTHALMIC at 21:15

## 2019-04-25 RX ADMIN — DILTIAZEM HYDROCHLORIDE 120 MG: 120 CAPSULE, EXTENDED RELEASE ORAL at 09:33

## 2019-04-25 RX ADMIN — SIMVASTATIN 10 MG: 10 TABLET, FILM COATED ORAL at 21:15

## 2019-04-25 RX ADMIN — ESCITALOPRAM 5 MG: 5 TABLET, FILM COATED ORAL at 21:15

## 2019-04-25 RX ADMIN — RIVAROXABAN 15 MG: 15 TABLET, FILM COATED ORAL at 09:33

## 2019-04-25 RX ADMIN — AZITHROMYCIN MONOHYDRATE 250 MG: 250 TABLET ORAL at 09:33

## 2019-04-25 RX ADMIN — CEFTRIAXONE SODIUM 1 G: 1 INJECTION, POWDER, FOR SOLUTION INTRAMUSCULAR; INTRAVENOUS at 12:20

## 2019-04-25 RX ADMIN — IPRATROPIUM BROMIDE AND ALBUTEROL SULFATE 3 ML: .5; 3 SOLUTION RESPIRATORY (INHALATION) at 11:12

## 2019-04-25 RX ADMIN — ALLOPURINOL 100 MG: 100 TABLET ORAL at 09:33

## 2019-04-25 RX ADMIN — IPRATROPIUM BROMIDE AND ALBUTEROL SULFATE 3 ML: .5; 3 SOLUTION RESPIRATORY (INHALATION) at 18:52

## 2019-04-25 RX ADMIN — METHYLPREDNISOLONE SODIUM SUCCINATE 40 MG: 40 INJECTION, POWDER, FOR SOLUTION INTRAMUSCULAR; INTRAVENOUS at 18:23

## 2019-04-25 RX ADMIN — METHYLPREDNISOLONE SODIUM SUCCINATE 15 MG: 40 INJECTION, POWDER, FOR SOLUTION INTRAMUSCULAR; INTRAVENOUS at 09:33

## 2019-04-25 ASSESSMENT — ACTIVITIES OF DAILY LIVING (ADL)
ADLS_ACUITY_SCORE: 27
ADLS_ACUITY_SCORE: 23
ADLS_ACUITY_SCORE: 22
ADLS_ACUITY_SCORE: 24

## 2019-04-25 ASSESSMENT — MIFFLIN-ST. JEOR: SCORE: 1050.13

## 2019-04-25 NOTE — PROGRESS NOTES
04/25/19 1345   Quick Adds   Type of Visit Initial PT Evaluation   Living Environment   Lives With alone   Living Arrangements assisted living  ([facility])   Home Accessibility no concerns   Self-Care   Usual Activity Tolerance moderate   Current Activity Tolerance fair   Regular Exercise No   Equipment Currently Used at Home walker, rolling  (4ww)   Functional Level Prior   Ambulation 1-->assistive equipment   Transferring 1-->assistive equipment   Fall history within last six months no   Which of the above functional risks had a recent onset or change? none   General Information   Onset of Illness/Injury or Date of Surgery - Date 04/24/19   Referring Physician Cong Massey MD   Patient/Family Goals Statement agreeable to go to TCU   Pertinent History of Current Problem (include personal factors and/or comorbidities that impact the POC) Pt admitted with SOB, found to have pneumonia. PMH: asthma, afib, ppm, CKD4, depression, dheart failure, PE history.   Precautions/Limitations fall precautions   Weight-Bearing Status - LLE full weight-bearing   Weight-Bearing Status - RLE full weight-bearing   Cognitive Status Examination   Orientation person   Level of Consciousness alert   Follows Commands and Answers Questions 100% of the time;able to follow single-step instructions   Personal Safety and Judgment intact   Memory impaired   Pain Assessment   Patient Currently in Pain No   Posture    Posture Forward head position;Protracted shoulders   Range of Motion (ROM)   ROM Quick Adds No deficits were identified   Strength   Strength Comments B hip flex 4/5, knee ext 4/5, DF 4/5   Bed Mobility   Bed Mobility Comments Sit to supine with max A   Transfer Skills   Transfer Comments Sit to stand with CGA and 4ww   Gait   Gait Comments Pt amb 2 ft bed to commode wtih 4ww and CGA   Balance   Balance Comments Dec balance noted with gait   Sensory Examination   Sensory Perception Comments Denies any numbness or  "tingling   General Therapy Interventions   Planned Therapy Interventions bed mobility training;gait training;neuromuscular re-education;strengthening;transfer training   Clinical Impression   Criteria for Skilled Therapeutic Intervention yes, treatment indicated   PT Diagnosis Difficulty ambulating   Influenced by the following impairments Dec strength, balance, activity tolerance   Functional limitations due to impairments Difficulty ambualting and transferring   Clinical Presentation Stable/Uncomplicated   Clinical Presentation Rationale medically stable   Clinical Decision Making (Complexity) Low complexity   Therapy Frequency` 5 times/week   Predicted Duration of Therapy Intervention (days/wks) 1 week   Anticipated Discharge Disposition Transitional Care Facility   Risk & Benefits of therapy have been explained Yes   Patient, Family & other staff in agreement with plan of care Yes   [facility] TM \"6 Clicks\"   2016, Trustees of [facility], under license to [employer].  All rights reserved.   6 Clicks Short Forms Basic Mobility Inpatient Short Form   [facility]  \"6 Clicks\" V.2 Basic Mobility Inpatient Short Form   1. Turning from your back to your side while in a flat bed without using bedrails? 3 - A Little   2. Moving from lying on your back to sitting on the side of a flat bed without using bedrails? 2 - A Lot   3. Moving to and from a bed to a chair (including a wheelchair)? 3 - A Little   4. Standing up from a chair using your arms (e.g., wheelchair, or bedside chair)? 3 - A Little   5. To walk in hospital room? 3 - A Little   6. Climbing 3-5 steps with a railing? 2 - A Lot   Basic Mobility Raw Score (Score out of 24.Lower scores equate to lower levels of function) 16   Total Evaluation Time   Total Evaluation Time (Minutes) 10     "

## 2019-04-25 NOTE — PLAN OF CARE
Discharge Planner OT   Patient plan for discharge: did not state, family plans TCU at discharge  Current status: OT eval completed and treatment initiated on 89yo female admitted with pneumonia. Baseline info provided from SW note. Pt currently living in [facility] and recieves escort to meals, assist with incontinence cares, meds, laundry and cleaning. She ambulates on own with her 4ww, performs dressing and grooming indep.  Today, pt in bed, awake upon arrival. Oriented to self only, not , place, state, month or year. She did follow 1-step directives consistently throughout session and recalled long past events accurately. When grandson arrived later in session pt did identify him correctly however she believed a nurse to be her daughter-in-law. Pt performed supine to sit and scoot to EOB following cues only. Sit-stand to her 4ww CGA, tolerated static standing, weight shift and march in place with CGA, amb 3-4 steps with 4ww and bed to chair Mod A of 1. Pt able to don a robe seated, required Mod A with LB dressing. Pt had episodes of cough/wheezing throughout session requiring significant time to resolve. Required Max A of 2 for sit to supine. Pt positioned upright in bed to eat noon meal, she is indep with self-feeding. Will not recall how to use call light - nurse aware.   Barriers to return to prior living situation: current level of A and confusion  Recommendations for discharge: TCU  Rationale for recommendations: pt will benefit from continued daily therapies following discharge to maximize safety and independence with mobilities and ADLs prior to return to her [facility].       Entered by: Danielito Richards 2019 12:32 PM

## 2019-04-25 NOTE — PROGRESS NOTES
[facility]  Hospitalist Progress Note   04/25/2019          Assessment and Plan:       Swati Solis is a 89-year-old with dementia, asthma admitted on 4/24/2019 with shortness of breath.    Community-acquired pneumonia.  Acute exacerbation of obstructive airway disease.  History of bronchial asthma.  Patient is a long-term nursing home resident, complained of shortness of breath for last couple of weeks.  In the ED Oxygen saturations were in the low 90s to high 80s.    Troponin 0 0.019.  WBC 9.4.  Blood cultures no growth to date.  Chest x-ray shows left basilar atelectasis and consolidation, airspace opacity in the right middle lobe and upper lobe.  Continue duo nebs  4 times a day, albuterol as needed.  Increase Solu-Medrol to 40 mg every 8 hours as continuing to wheeze [closely monitor mental status at risk for delirium while on steroids]  Continue IV ceftriaxone and azithromycin.  Follow blood culture results.  We will check procalcitonin level.  Aggressive incentive spirometry as able to participate.     Atypical chest discomfort.  Will check troponin and ekg  We will check pro BNP, echo if pro bnp elevated given ongoing shortness of breath.  Telemetry monitoring overnight.    History of dementia.  Minimize interruptions, frequent reorientation.  More closely monitor mental status while on the IV steroids.    History of CKD stage III/IV.  Present with creatinine of 1.62, creatinine this morning at 1.23.  Monitor renal function closely, avoid nephrotoxic drugs.    Transaminitis likely in the setting of infection.  ALT 51, AST 46   Continue to monitor, continue PTA statin therapy.    Hyperlipidemia.  Continue PTA simvastatin.    History of gout.  Continue PT allopurinol.    History of atrial fibrillation.  History of diastolic/systolic heart failure.  History of PE.  Denies any palpitations.  Appears euvolemic.  Continue PTA Cardizem and rivaroxaban.    Depression   Continue PTA  Lexapro.    Physical deconditioning due to senile fragility/acute illness.  PT, OT assessment.      Orders Placed This Encounter      2 Gram Sodium Diet No Red Meat      DVT Prophylaxis: SCDs, on anticoagulation.  Code Status: DNR  Disposition: Expected discharge in 1 to 2 days pending clinical improvement    Patient, granddaughter, interdisciplinary team involved in care and agrees with plan.  Total time > 25 min more than 60% of time spent in direct patient care, care coordination, patient/caregiver counseling, and formalizing plan of care.     Tamika Willoughby MD        Interval History:      Eating on the edge of the child.  Continues to complain of shortness of breath and wheezing.  No fever since admission.  No headache or dizziness.  No chest pain or palpitation.  Tolerating oral diet.         Physical Exam:        Physical Exam   Temp:  [97.5  F (36.4  C)-98.3  F (36.8  C)] 97.7  F (36.5  C)  Pulse:  [60-66] 61  Resp:  [18-24] 20  BP: (125-185)/(53-81) 154/70  SpO2:  [90 %-95 %] 95 %    Intake/Output Summary (Last 24 hours) at 4/25/2019 1015  Last data filed at 4/25/2019 0000  Gross per 24 hour   Intake --   Output 675 ml   Net -675 ml       Admission Weight: 69.4 kg (153 lb)  Current Weight: 68.1 kg (150 lb 2.1 oz)    PHYSICAL EXAM  GENERAL: Awake and can answer simple commands, not using accessory muscles of respiration.    Able to speak in full sentences.  HEENT: Oropharynx pink, moist.  HEART: Regular rate and rhythm. S1S2.  Systolic murmur right sternal border.  LUNGS: Reveal decreased breath sounds, DIFFUSE wheezing no crackles.  ABDOMEN: Soft, no abdominal tenderness, bowel sounds heard   NEURO moving all extremities, no focal weakness.  EXTREMITIES: No pedal edema. 2+ peripheral pulses.  SKIN: Warm, dry.  PSYCHIATRY Cooperative       Medications:          allopurinol  100 mg Oral Daily     azithromycin  250 mg Oral Daily     cefTRIAXone  1 g Intravenous Q24H     diltiazem ER  120 mg Oral Daily      escitalopram  5 mg Oral QPM     fluticasone  1 puff Inhalation Daily     ipratropium - albuterol 0.5 mg/2.5 mg/3 mL  3 mL Nebulization 4x daily     latanoprost  1 drop Both Eyes At Bedtime     methylPREDNISolone  15 mg Intravenous BID     rivaroxaban ANTICOAGULANT  15 mg Oral Daily     simvastatin  10 mg Oral At Bedtime     acetaminophen, albuterol, bisacodyl, melatonin, naloxone, ondansetron **OR** ondansetron, - MEDICATION INSTRUCTIONS -, polyethylene glycol, senna-docusate **OR** senna-docusate         Data:      All new lab and imaging data was reviewed.

## 2019-04-25 NOTE — PLAN OF CARE
DATE & TIME: 4/24/19 7688-7663  ORIENTATION: A&Ox4, but forgetful/confused at times  BEHAVIOR & AGGRESSION TOOL COLOR: Green, calm & cooperative, but does refuse some cares  CIWA SCORE: n/a  ABNL VS/O2:VSS on RA  MOBILITY: Up with strong 2 assist, GB & walker.   PAIN MANAGMENT: Denies pain  DIET: 2g Na diet no red meats; poor intake  BOWEL/BLADDER: WDL, UA needed  ABNL LAB/BG: n/a  DRAIN/DEVICES: PIV SL  TELEMETRY RHYTHM: n/a  SKIN: bruising, otherwise WDL  TESTS/PROCEDURES: n/a  D/C DAY/GOALS/PLACE: pending  OTHER IMPORTANT INFO: LS diminished on R side, Coarse & wheezy on L side; Denies SOB, but is KAUR, some accessory muscle use while resting; refused evening neb treatment, but did take PRN inhaler.

## 2019-04-25 NOTE — PROGRESS NOTES
19 1206   Quick Adds   Type of Visit Initial Occupational Therapy Evaluation   Living Environment   Lives With facility resident   Living Arrangements assisted living  ([place])   Home Accessibility no concerns   Transportation Anticipated family or friend will provide   Living Environment Comment Recent move from [place] along w/DIL after son , grandson lives here.    Self-Care   Equipment Currently Used at Home   (4ww)   Functional Level   Ambulation 1-->assistive equipment   Transferring 1-->assistive equipment   Toileting 2-->assistive person   Bathing 2-->assistive person   Dressing 0-->independent   Eating 0-->independent   Communication 0-->understands/communicates without difficulty  ([place] accent/born in [place])   Swallowing 0-->swallows foods/liquids without difficulty   Cognition 1 - attention or memory deficits   Fall history within last six months no   Prior Functional Level Comment Per SW report pt was receiving assist with incontinence cares, laundry, cleaning, meds and escort to meals, she ambulated indep in room with her 4ww, dressed and groomed indep. Mild dementia at baseline.    General Information   Onset of Illness/Injury or Date of Surgery - Date 19   Referring Physician Dr. Massey   Patient/Family Goals Statement family: TCU prior to return to her intermediate   Additional Occupational Profile Info/Pertinent History of Current Problem 89yo female admitted with generalized weakness and worsening SOB (in setting of underlying asthsma), diagnosed with pneumonia. PMH includes mild dementia, asthsma, afib.    Precautions/Limitations fall precautions   Cognitive Status Examination   Orientation person   Level of Consciousness alert   Follows Commands (Cognition) follows one step commands   Memory impaired   Executive Function Working memory impaired, decreased storage of information for performing tasks   Cognitive Comment Pt oriented to past, did not know what state she's in, not oriented  to month or year, thinks nurse is her daughter-in-law. She did recognize and introduced grandson when he arrived, she was able to name THOR.    Pain Assessment   Patient Currently in Pain   (No pain, but coughing, wheezing frequently)   Range of Motion (ROM)   ROM Comment RUE WNL; L larry limited d/t OA approx 120o flex   Strength   Strength Comments R larry 4-/5; L not test d/t pain from OA   Coordination   Coordination Comments Able to use both hands functionally to feed herself, drink from mug   Mobility   Bed Mobility Comments Supine to sit Min A of 1, pt able to scoot to EOB indep. Sit to supine Max A of 2.    Transfer Skill: Bed to Chair/Chair to Bed   Level of Schleicher: Bed to Chair moderate assist (50% patients effort)   Physical Assist/Nonphysical Assist: Bed to Chair 1 person assist   Assistive Device - Transfer Skill Bed to Chair Chair to Bed Rehab Eval rolling walker   Transfer Skill: Sit to Stand   Level of Schleicher: Sit/Stand contact guard   Physical Assist/Nonphysical Assist: Sit/Stand verbal cues;1 person assist   Assistive Device for Transfer: Sit/Stand rolling walker   Toilet Transfer   Toilet Transfer Comments BSC transfer Min-Mod A of 1 using pt's 4ww   Balance   Balance Comments Indep static sitting, Min A dynamic sitting. CGA static standing.   Lower Body Dressing   Level of Schleicher: Dress Lower Body moderate assist (50% patients effort)   Physical Assist/Nonphysical Assist: Dress Lower Body 1 person assist   Grooming   Level of Schleicher: Grooming minimum assist (75% patients effort)   Physical Assist/Nonphysical Assist: Grooming set-up required;1 person assist  (seated)   Eating/Self Feeding   Level of Schleicher: Eating independent   Physical Assist/Nonphysical Assist: Eating set-up required  (sitting up in bed)   Activities of Daily Living Analysis   Impairments Contributing to Impaired Activities of Daily Living strength decreased;cognition impaired;balance impaired  "  General Therapy Interventions   Planned Therapy Interventions ADL retraining;strengthening;transfer training;cognition   Clinical Impression   Criteria for Skilled Therapeutic Interventions Met yes, treatment indicated   OT Diagnosis decreased ADL/IADL performance   Influenced by the following impairments illness, generalized weakness/decreased activity tolerance, impaired cognition   Assessment of Occupational Performance 3-5 Performance Deficits   Identified Performance Deficits functional mobility, dressing, grooming   Clinical Decision Making (Complexity) Low complexity   Therapy Frequency daily   Predicted Duration of Therapy Intervention (days/wks) 4 days   Anticipated Discharge Disposition Transitional Care Facility   Risks and Benefits of Treatment have been explained. Yes   Patient, Family & other staff in agreement with plan of care Yes   [facility] TM \"6 Clicks\"   2016, Trustees of [facility], under license to [employer].  All rights reserved.   6 Clicks Short Forms Daily Activity Inpatient Short Form   [facility]  \"6 Clicks\" Daily Activity Inpatient Short Form   1. Putting on and taking off regular lower body clothing? 2 - A Lot   2. Bathing (including washing, rinsing, drying)? 2 - A Lot   3. Toileting, which includes using toilet, bedpan or urinal? 2 - A Lot   4. Putting on and taking off regular upper body clothing? 3 - A Little   5. Taking care of personal grooming such as brushing teeth? 3 - A Little   6. Eating meals? 4 - None   Daily Activity Raw Score (Score out of 24.Lower scores equate to lower levels of function) 16   Total Evaluation Time   Total Evaluation Time (Minutes) 15     "

## 2019-04-25 NOTE — PROVIDER NOTIFICATION
MD Notification    Notified Person: MD    Notified Person Name:    Notification Date/Time:04/25/2019 at 1630    Notification Interaction:page Dr Willoughby    Purpose of Notification: Troponin 0.015, BNP 61443    Orders Received: Echocardiogram ordered    Comments:

## 2019-04-25 NOTE — PROGRESS NOTES
Care Transition Initial Assessment - SW     Met with: Spoke with pt's daughter in law, Ruth.    Active Problems:    Community acquired pneumonia       DATA  Lives With: [facility].   Quality of Family Relationships: involved, supportive  Description of Support System: Supportive, Involved  Who is your support system?: Children, daughter in law, Delia, grandYuriy thompson and his wife Ann  Support Assessment: Adequate family and caregiver support. Facility provides incontinence cares, escorts to meals, laundry and cleaning and medications. PT/OT was scheduled to start.  Identified issues/concerns regarding health management: Pt moved to [place] from [place] in the past few weeks. Her son passed away a few months ago and Ruth decided it was best to move back to [place] where the rest of the family is. Ruth is still in the process of selling her and the pt's homes in [place]. Ruth has provided a Health Care Directive listing her as the Health Care Agent, not guardian as stated in the H&P. Ruth confirmed she does not have guardianship. The [facility] was emailed to [employer] to be scanned in to the chart. Ruth states pt's grandsonYuriy, and his wife, Ann will be involved in decision making while she is in [place]. Discussed discharge planning and SW role in the hospital. Ruth reports the Hospitalist already brought up TCU upon admission and she is in agreement. Discussed PT/OT is ordered and will make their recommendation as well. LESLY received a phone call from SERGIO Walters at [facility]. She reports pt is independent to SBA in her apartment. LESLY will meet with family again to get TCU choices once discharge date known.     Quality of Family Relationships: involved, supportive    ASSESSMENT  Cognitive Status: Forgetful/confued, per nursing. Family appears involved and supportive.  Concerns to be addressed: On-going discharge planning.     PLAN  Financial costs for the patient includes:  None.  Patient given options and choices for discharge: Yes.  Patient/family is agreeable to the plan? YES  Patient Goals and Preferences: TCU.  Patient anticipates discharging to: TCU    LIV Stubbs, ULISSESSW  [phone]

## 2019-04-25 NOTE — PLAN OF CARE
PT:  Discharge Planner PT   Patient plan for discharge: Agreeable to go to TCU  Current status: Orders received, eval completed, treatment initiated. Pt admitted with SOB and found to have pneumonia. Prior to admit pt was living alone in [facility], uses a 4ww, is independent with mobility in her apartment and gets an escort to meals. Currently requires CGA for sit to stand with 4ww, CGA for gait of 2 ftx2 bed to/from commode, max A for sit to supine, dependent for scooting up in bed. Pt states she is fatigued and declined any further activity. Pt demonstrates dec strength, balance, activity tolerance and difficulty ambulating and transferring and would benefit from skilled PT services in order to improve this.  Barriers to return to prior living situation: Lives alone, needs assist with mobility, falls risk  Recommendations for discharge: TCU  Rationale for recommendations: Pt would benefit from continued PT to improve strength, balance, mobility to increase independence, reduce falls risk and increase safety before returning home.       Entered by: Tila Aggarwal 04/25/2019 2:11 PM

## 2019-04-25 NOTE — PROGRESS NOTES
Pt is on room air with SpO2 93-96% . Breath sounds were expiratory wheezes before and after the treatment.   All nebs were given as ordered by MD and will continue to monitor and follow.     Julita Rico, RRT  4/25/2019 5:59 PM

## 2019-04-25 NOTE — PLAN OF CARE
DATE & TIME: 4/25/2019 Day shift   ORIENTATION: A&O x2, disoriented to place and time  BEHAVIOR & AGGRESSION TOOL COLOR: Green  ABNL VS/O2: VSS on RA  MOBILITY: Assist of 1-2 with gait belt and walker  PAIN MANAGMENT: C/o generalized pain, declined interventions  DIET: 2gm NA, no red meat   BOWEL/BLADDER: Incontinent at times  ABNL LAB/BG:Creat 1.23  DRAIN/DEVICES: IV SL  SKIN: Bruised, redness blanchable on coccyx, turn and repo Q2H  TESTS/PROCEDURES: None   D/C DAY/GOALS/PLACE: Discharge pending once medically stable  OTHER IMPORTANT INFO: LS expiratory wheezes, BS active x4. Up to chair for meals. Primary language is [place]. Confused and forgetful. Has scheduled nebs. Social work following. Has compression socks on.

## 2019-04-25 NOTE — PROVIDER NOTIFICATION
MD Notification    Notified Person: MD    Notified Person Name: Fartun    Notification Date/Time: 4/25/2019 1558     Notification Interaction: Text    Purpose of Notification: Pt C/o chest pressure rated at 6/10. Per pt the pressure is very similar to when she first came in. Trop on admission was 0.019, sating 95% on RA, HR 64.    Orders Received: Pending     Comments:

## 2019-04-25 NOTE — H&P
Admitted:     04/24/2019      PRIMARY CARE PROVIDER:  Hanna Samuels MD      CHIEF COMPLAINT:  Shortness of breath.      HISTORY OF PRESENT ILLNESS:  Ms. Swati Solis is a 89-year-old DNR female who just recently moved from [place] with history of underlying asthma, who presents to [facility] from her long-term facility with shortness of breath.  According to the staff, the patient has been feeling short of breath the last couple of weeks.  According to her grandson, the patient's staff called the patient's daughter-in-law who is the power of , because they were worried over her vital signs.  The patient's daughter-in-law, Ruth Solis, whose number is [phone] is her guardian and power of .  She directed for the patient to be sent to the hospital.  The patient also may not have been getting her asthma inhalers.  The patient was brought by EMS, but en route, did give her a neb treatment.  It is not clear whether this had helped her.  The patient came to [facility] for further assessment.      In the emergency room, the patient was seen by Dr. Jackson Manjarrez.  The patient was afebrile with stable vital signs.  Oxygen saturations were in the low 90s to high 80s.  Blood work revealed normal electrolytes, BUN was 43, creatinine 1.6 with a calculated eGFR of 27.  LFTs were significant for albumin of e, otherwise, other LFTs were normal.  Troponin is below reference 0.019, glucose 135.  White count 9.4, hemoglobin 11.5, platelets 227, blood cultures are obtained.  A 2-view chest x-ray showed enlarged cardiac silhouette and trace left pleural effusion.  There is also left basilar atelectasis and consolidation.  There is also airspace opacity in the right middle lobe and upper lobe.  The patient was given ceftriaxone and Zithromax as well as some DuoNebs and is being admitted as inpatient for community-acquired pneumonia.      The patient's care was discussed  with the patient's power of , Ruth Solis.  The patient is supposed to be on a low-sodium diet and due to her gout, she should not get any red meat.  The patient is DNI/DNR, but could be intubated for a limited amount of time.      PAST MEDICAL HISTORY:   1.  Asthma.   2.  Atrial fibrillation, status post pacemaker placement.   3.  Chronic kidney disease stage III, IV.   4.  Depression.   5.  Gout.   6.  History of diastolic and systolic heart failure.   7.  History of pulmonary embolism.   8.  History of pre-glaucoma.      PAST SURGICAL HISTORY:  Cholecystectomy and appendectomy.      FAMILY HISTORY:  Reviewed and negative.      SOCIAL HISTORY:  The patient is DNR.  No tobacco, no alcohol.  , currently living in a long-term care nursing home.      ALLERGIES:  AMOXICILLIN.      CURRENT MEDICATIONS:   1.  Tylenol 650 every 4 hours   2.  Albuterol 2 puffs every 2 hours as needed for shortness of breath.   3.  Allopurinol 100 mg once day.   4.  Diltiazem extended release 120 mg once daily.   5.  Lexapro 5 mg once daily.   6.  Flovent 1 puff q.12 h.   7.  Lasix 20 mg every Monday, Wednesday, Friday morning.   8.  Xalatan 0.005%, 1 drop both eyes at bedtime.   9.  Lovastatin 20 mg in the evening.   10.  Rivaroxaban 15 mg daily.      REVIEW OF SYSTEMS:  Ten-point systems reviewed.  The patient denies any chest pain, but complains of shortness of breath and cough.  Denies any lower extremity edema.  Denies any syncope.  Denies any nausea, vomiting or black or bloody stools.  Denies any headache, confusion or seizure-like activity.  Otherwise, 10-point review of systems was reviewed and unremarkable.      PHYSICAL EXAMINATION:   GENERAL:  The patient is alert, pleasant, cooperative, in no distress.  She is oriented to herself, not location.   HEENT:  Pupils equal.  Sclerae are anicteric.  Oropharynx without any lesions.  Mucous membranes are moist.   NECK:  No neck vein distention.   PULMONARY:  She has  audible wheezing and prolonged expiratory phase and some mild accessory muscle use.   CARDIOVASCULAR:  S1, S2, regular rate and rhythm.   ABDOMEN:  Soft, nontender, normoactive bowel sounds.   EXTREMITIES:  No edema.   SKIN:  Warm, dry, well perfused.   NEUROLOGIC:  She is oriented only to herself, able to move all 4 extremities.   PSYCHIATRIC:  Mood and affect stable.      LABORATORY DATA:  As dictated above in the history of present illness.      ASSESSMENT:  Swati Coats is a 89-year-old who is a long-term nursing home patient with mild to moderate dementia, who is DNR being admitted for shortness of breath and finding of right middle and right upper lobe pneumonia, suspect a community-acquired pneumonia, being admitted for further treatment, who also has underlying exacerbation of asthma.      PLAN:   1.  Community-acquired pneumonia.  The patient will be treated with ceftriaxone, Zithromax, standard protocol.   2.  History of asthma with mild exacerbation.  The patient will be continued on her albuterol metered dose inhaler.  We will also start the patient on DuoNebs 4 times a day.  She did receive Solu-Medrol in the Emergency Department. We will continue the patient on IV Solu-Medrol 50 mg twice a day.  Currently, she is on very low, 1 to 2 liters of oxygen.   3.  Gout.  We will continue the patient on allopurinol.   4.  History of atrial fibrillation.  The patient's rate is controlled.  We will continue the patient on her diltiazem and rivaroxaban.   5.  Depression.  We will continue the patient on Lexapro.   6.  Deep venous thrombosis prophylaxis.  The patient is already anticoagulated with Xarelto.      CODE STATUS:  DNR.         JESSI GUERRA MD             D: 2019   T: 2019   MT: KELLY      Name:     SWATI COATS   MRN:      [id number]        Account:      [id number]   :      12/10/1924        Admitted:     2019                   Document: [id number]       cc: Hanna Samuels  MD

## 2019-04-26 ENCOUNTER — APPOINTMENT (OUTPATIENT)
Dept: PHYSICAL THERAPY | Facility: CLINIC | Age: 84
DRG: 190 | End: 2019-04-26
Payer: MEDICARE

## 2019-04-26 ENCOUNTER — APPOINTMENT (OUTPATIENT)
Dept: CARDIOLOGY | Facility: CLINIC | Age: 84
DRG: 190 | End: 2019-04-26
Attending: HOSPITALIST
Payer: MEDICARE

## 2019-04-26 LAB
ALBUMIN SERPL-MCNC: 2.7 G/DL (ref 3.4–5)
ALP SERPL-CCNC: 107 U/L (ref 40–150)
ALT SERPL W P-5'-P-CCNC: 61 U/L (ref 0–50)
ANION GAP SERPL CALCULATED.3IONS-SCNC: 7 MMOL/L (ref 3–14)
AST SERPL W P-5'-P-CCNC: 45 U/L (ref 0–45)
BILIRUB SERPL-MCNC: 0.3 MG/DL (ref 0.2–1.3)
BUN SERPL-MCNC: 54 MG/DL (ref 7–30)
CALCIUM SERPL-MCNC: 8.7 MG/DL (ref 8.5–10.1)
CHLORIDE SERPL-SCNC: 103 MMOL/L (ref 94–109)
CO2 SERPL-SCNC: 23 MMOL/L (ref 20–32)
CREAT SERPL-MCNC: 1.43 MG/DL (ref 0.52–1.04)
GFR SERPL CREATININE-BSD FRML MDRD: 31 ML/MIN/{1.73_M2}
GLUCOSE SERPL-MCNC: 185 MG/DL (ref 70–99)
POTASSIUM SERPL-SCNC: 4.4 MMOL/L (ref 3.4–5.3)
PROT SERPL-MCNC: 6.7 G/DL (ref 6.8–8.8)
SODIUM SERPL-SCNC: 133 MMOL/L (ref 133–144)

## 2019-04-26 PROCEDURE — 36415 COLL VENOUS BLD VENIPUNCTURE: CPT | Performed by: HOSPITALIST

## 2019-04-26 PROCEDURE — 93306 TTE W/DOPPLER COMPLETE: CPT | Mod: 26 | Performed by: INTERNAL MEDICINE

## 2019-04-26 PROCEDURE — 40000275 ZZH STATISTIC RCP TIME EA 10 MIN

## 2019-04-26 PROCEDURE — 12000000 ZZH R&B MED SURG/OB

## 2019-04-26 PROCEDURE — 25000132 ZZH RX MED GY IP 250 OP 250 PS 637: Performed by: INTERNAL MEDICINE

## 2019-04-26 PROCEDURE — A9270 NON-COVERED ITEM OR SERVICE: HCPCS | Performed by: INTERNAL MEDICINE

## 2019-04-26 PROCEDURE — 99233 SBSQ HOSP IP/OBS HIGH 50: CPT | Performed by: INTERNAL MEDICINE

## 2019-04-26 PROCEDURE — 80053 COMPREHEN METABOLIC PANEL: CPT | Performed by: HOSPITALIST

## 2019-04-26 PROCEDURE — 25500064 ZZH RX 255 OP 636: Performed by: INTERNAL MEDICINE

## 2019-04-26 PROCEDURE — 40000556 ZZH STATISTIC PERIPHERAL IV START W US GUIDANCE

## 2019-04-26 PROCEDURE — 25000125 ZZHC RX 250: Performed by: INTERNAL MEDICINE

## 2019-04-26 PROCEDURE — 97530 THERAPEUTIC ACTIVITIES: CPT | Mod: GP

## 2019-04-26 PROCEDURE — 25000128 H RX IP 250 OP 636: Performed by: HOSPITALIST

## 2019-04-26 PROCEDURE — 97110 THERAPEUTIC EXERCISES: CPT | Mod: GP

## 2019-04-26 PROCEDURE — 40000264 ECHOCARDIOGRAM COMPLETE

## 2019-04-26 PROCEDURE — 94640 AIRWAY INHALATION TREATMENT: CPT | Mod: 76

## 2019-04-26 PROCEDURE — 94640 AIRWAY INHALATION TREATMENT: CPT

## 2019-04-26 PROCEDURE — 40000257 ZZH STATISTIC CONSULT NO CHARGE VASC ACCESS

## 2019-04-26 PROCEDURE — 40000141 ZZH STATISTIC PERIPHERAL IV START W/O US GUIDANCE

## 2019-04-26 PROCEDURE — 25000131 ZZH RX MED GY IP 250 OP 636 PS 637: Performed by: INTERNAL MEDICINE

## 2019-04-26 RX ORDER — PREDNISONE 5 MG/1
5 TABLET ORAL DAILY
Status: DISCONTINUED | OUTPATIENT
Start: 2019-05-06 | End: 2019-04-28 | Stop reason: HOSPADM

## 2019-04-26 RX ORDER — PREDNISONE 20 MG/1
40 TABLET ORAL 2 TIMES DAILY WITH MEALS
Status: COMPLETED | OUTPATIENT
Start: 2019-04-26 | End: 2019-04-26

## 2019-04-26 RX ORDER — CEFUROXIME AXETIL 500 MG/1
500 TABLET ORAL EVERY 12 HOURS SCHEDULED
Status: DISCONTINUED | OUTPATIENT
Start: 2019-04-26 | End: 2019-04-28 | Stop reason: HOSPADM

## 2019-04-26 RX ORDER — FUROSEMIDE 20 MG
20 TABLET ORAL
Status: DISCONTINUED | OUTPATIENT
Start: 2019-04-26 | End: 2019-04-28

## 2019-04-26 RX ORDER — PREDNISONE 20 MG/1
20 TABLET ORAL DAILY
Status: DISCONTINUED | OUTPATIENT
Start: 2019-04-30 | End: 2019-04-28 | Stop reason: HOSPADM

## 2019-04-26 RX ORDER — PREDNISONE 20 MG/1
40 TABLET ORAL DAILY
Status: DISCONTINUED | OUTPATIENT
Start: 2019-04-27 | End: 2019-04-28 | Stop reason: HOSPADM

## 2019-04-26 RX ORDER — PREDNISONE 10 MG/1
10 TABLET ORAL DAILY
Status: DISCONTINUED | OUTPATIENT
Start: 2019-05-03 | End: 2019-04-28 | Stop reason: HOSPADM

## 2019-04-26 RX ADMIN — METHYLPREDNISOLONE SODIUM SUCCINATE 40 MG: 40 INJECTION, POWDER, FOR SOLUTION INTRAMUSCULAR; INTRAVENOUS at 00:46

## 2019-04-26 RX ADMIN — DILTIAZEM HYDROCHLORIDE 120 MG: 120 CAPSULE, EXTENDED RELEASE ORAL at 09:19

## 2019-04-26 RX ADMIN — AZITHROMYCIN MONOHYDRATE 250 MG: 250 TABLET ORAL at 09:19

## 2019-04-26 RX ADMIN — IPRATROPIUM BROMIDE AND ALBUTEROL SULFATE 3 ML: .5; 3 SOLUTION RESPIRATORY (INHALATION) at 19:40

## 2019-04-26 RX ADMIN — PREDNISONE 40 MG: 20 TABLET ORAL at 17:56

## 2019-04-26 RX ADMIN — IPRATROPIUM BROMIDE AND ALBUTEROL SULFATE 3 ML: .5; 3 SOLUTION RESPIRATORY (INHALATION) at 07:08

## 2019-04-26 RX ADMIN — IPRATROPIUM BROMIDE AND ALBUTEROL SULFATE 3 ML: .5; 3 SOLUTION RESPIRATORY (INHALATION) at 10:43

## 2019-04-26 RX ADMIN — ALLOPURINOL 100 MG: 100 TABLET ORAL at 09:19

## 2019-04-26 RX ADMIN — LATANOPROST 1 DROP: 50 SOLUTION/ DROPS OPHTHALMIC at 21:02

## 2019-04-26 RX ADMIN — CEFUROXIME AXETIL 500 MG: 500 TABLET ORAL at 21:02

## 2019-04-26 RX ADMIN — FLUTICASONE FUROATE 1 PUFF: 100 POWDER RESPIRATORY (INHALATION) at 09:19

## 2019-04-26 RX ADMIN — SIMVASTATIN 10 MG: 10 TABLET, FILM COATED ORAL at 21:02

## 2019-04-26 RX ADMIN — ALBUTEROL SULFATE 2 PUFF: 90 AEROSOL, METERED RESPIRATORY (INHALATION) at 21:06

## 2019-04-26 RX ADMIN — HUMAN ALBUMIN MICROSPHERES AND PERFLUTREN 9 ML: 10; .22 INJECTION, SOLUTION INTRAVENOUS at 08:23

## 2019-04-26 RX ADMIN — RIVAROXABAN 15 MG: 15 TABLET, FILM COATED ORAL at 09:19

## 2019-04-26 RX ADMIN — ALBUTEROL SULFATE 2 PUFF: 90 AEROSOL, METERED RESPIRATORY (INHALATION) at 10:04

## 2019-04-26 RX ADMIN — FUROSEMIDE 20 MG: 20 TABLET ORAL at 11:25

## 2019-04-26 RX ADMIN — ESCITALOPRAM 5 MG: 5 TABLET, FILM COATED ORAL at 21:02

## 2019-04-26 RX ADMIN — PREDNISONE 40 MG: 20 TABLET ORAL at 10:04

## 2019-04-26 ASSESSMENT — ACTIVITIES OF DAILY LIVING (ADL)
ADLS_ACUITY_SCORE: 22

## 2019-04-26 ASSESSMENT — MIFFLIN-ST. JEOR: SCORE: 1063.13

## 2019-04-26 NOTE — PROVIDER NOTIFICATION
MD Notification    Notified Person: MD    Notified Person Name:John    Notification Date/Time:4/26 at 1530    Notification Interaction:text    Purpose of Notification:Pt lossed IV access and IV nurse was unable to replace. Can antibiotics be changed to oral    Orders Received:MD to place for oral antibiotics.    Comments:

## 2019-04-26 NOTE — PLAN OF CARE
Discharge Planner PT   Patient plan for discharge: did not state  Current status: Pt was up in a chair upon PT arrival. Pt requires Wilber progressing to CGA for sit<>stand transfers from chair to her own 4WW. Declined trial of ambulation due to increased coughing fits with mobility. Participated well with seated and standing LE exercises.  Barriers to return to prior living situation: lives alone, level of assist required, fatigue, fall risk  Recommendations for discharge: TCU  Rationale for recommendations: Pt would benefit from PT at TCU to progress strength, balance and mobility so pt can return home safely.        Entered by: Sandy Cast 04/26/2019 11:43 AM

## 2019-04-26 NOTE — PROVIDER NOTIFICATION
MD Notification    Notified Person: MD    Notified Person Name:    Notification Date/Time: 04/25/2019 at 1900    Notification Interaction:Spoke with Dr Flores via phone     Purpose of Notification: Pt is feeling SOB, despite neb treatment, saturating 94% on RA, BNP 30615 wondering an order?     Orders Received:STAT Chest X-Ray ordered. Dr Flores came at bedside and evaluated pt. No diuresing indicated at moment.     Comments:

## 2019-04-26 NOTE — PROGRESS NOTES
D: LESLY following for discharge planning. Anticipate discharge to TCU Saturday.  I: SW spoke with pt's daughter in law, Ruth. She requests referrals to [place] and [place]. [facility].  P: Referrals sent via DOD.     LIV Stubbs, LGSW  [phone]    Addendum: [facility] can accept on Saturday.

## 2019-04-26 NOTE — PROGRESS NOTES
DATE & TIME: 2300-0730 4/26/19  ORIENTATION: A/Ox2  BEHAVIOR & AGGRESSION TOOL COLOR: GREEN  CIWA SCORE:  ABNL VS/O2: VSS. RA  MOBILITY: Ax2   PAIN MANAGMENT: denies   DIET: 2g Na  BOWEL/BLADDER: up to bedside commode   ABNL LAB/BG: trop <0.015  DRAIN/DEVICES:  Saline locked   TELEMETRY RHYTHM: NSR  SKIN: blanchable coccyx  TESTS/PROCEDURES: Echocardiogram today   D/C DAY/GOALS/PLACE: discharge in 1-2 days   OTHER IMPORTANT INFO: lower lobe wheezes.

## 2019-04-26 NOTE — PLAN OF CARE
OT attempted.  PT just leaving room, and stated patient was fatigued by PT session, having coughing fits due to asthma, would be more appropriate for further therapy in PM.

## 2019-04-26 NOTE — PROGRESS NOTES
IV team late entry: attempted to place peripheral IV x3. 2 without US and 1 with US. Got blood flashback but site infiltrated with saline flush each time. RN to call anesthesia for IV placement.

## 2019-04-26 NOTE — PLAN OF CARE
[facility]   [04/26/19 1448 Amarilys Gray RN - Registered Nurse]   DATE & TIME:4/26 at 1444  ORIENTATION:oriented x2  BEHAVIOR & AGGRESSION TOOL COLOR:greenCIWA SCORE:  ABNL VS/O2:  MOBILITY:up with 1/walker/belt  PAIN MANAGMENT:naDIET:  BOWEL/BLADDER:Incontinent at times  ABNL LAB/BG:  DRAIN/DEVICES:  TELEMETRY RHYTHM:dc'd  SKIN:coccyx blanchable redness  TESTS/PROCEDURES:  D/C DAY/GOALS/PLACE:1-2 days  OTHER IMPORTANT INFO: KAUR,expiratory wheezes and diminished lung sounds,npc

## 2019-04-26 NOTE — PLAN OF CARE
"DATE & TIME:04/25/2019 2396-2399  ORIENTATION:A&O x 2, disoriented to time and place. Cooperative at times. Anxious and uncooperative at times.   BEHAVIOR & AGGRESSION TOOL COLOR:Green  CIWA SCORE:N/A  ABNL VS/O2: VSS on RA, except hypertensive  MOBILITY: up x 2 assist to bedside commode. Turn and reposition Q 2 hours.  PAIN MANAGMENT: reported chest tightness, stated \"its similar for admission. It getts better and come back.\"MD updated. Improved after neb treatment.   DIET:2 gram Na diet with poor appetite.   BOWEL/BLADDER: Continent, uses bedside commode with 2 assist   ABNL LAB/BG:BNP 96194, Cr 1.23  DRAIN/DEVICES: PIV saline lock  TELEMETRY RHYTHM:NSR  SKIN: Blanchable readdress on coccyx area. Turn and repo Q 2 hours.  TESTS/PROCEDURES: Chest X-ray done this shift, Echocardiogram tomorrow.  D/C DAY/GOALS/PLACE: Pending in clinically improve.   OTHER IMPORTANT INFO: Lung sound expiratory wheezes on lower lobes. Diminished on upper lobes. KAUR, Receiving neb treatment with some improvement.      "

## 2019-04-26 NOTE — PROGRESS NOTES
[facility]    Hospitalist Progress Note    Date of Service (when I saw the patient): 04/26/2019    Assessment & Plan   Swati Solis is a 89 year old female recently moved to [place] from [place] with hx of dementia, CHF, chronic a-fib s/p PPM and prior PE on chronic anticoagulation with rivaroxaban, CKD, and asthma/COPD who presented from her care facility on 4/24/2019 with shortness of breath, and was admitted for pneumonia and COPD exacerbation.    Bilateral community acquired bacterial pneumonia with acute asthma exacerbation, Improving  Presented from her long term care facility with progressive shortness of breath. spO2 high 80s to mid 90s on arrival, but has not required supplemental O2. CXR on arrival showed left basilar atelectasis vs consolidation and new opacity in the right mid to upper lung. On admission, she was initiated on IV steroids, duonebs, and antibiotics (ceftriaxone and azithromycin) with improvement.  - Breathing comfortably on room air  - Continues on ceftriaxone and azithromycin  - d/c IV methylprednisolone q8h and start prednisone taper: prednisone 40 mg BID today then taper over 10 days  - Continues on duonebs QID  - Encourage incentive spirometry  Addendum: Patient lost IV access, unable to replace by IV team. Will order Ceftin 500 mg BID, and monitor    Atypical chest pain  Likely due to pneumonia/asthma exacerbation. Reported chest heaviness at rest during her hospitalization. EKG showed v-paced rhythm and serial troponins were negative. TTE (4/25) showed LVEF 50-55% with mid anteroseptal hypokinesis, but no prior echo for comparision. Doubt new WMA given negative troponins.   - Consider outpatient stress test once she recovers from pneumonia  - Not on aspirin due to chronic anticoagulation with rivaroxaban  - Holding PTA lovastatin due to mildly elevated LFTs    History of chronic systolic and diastolic CHF  TTE (4/25) showed LVEF 50-55% with mid anteroseptal  hypokinesis and indeterminate diastolic function  - Appears euvolemic  - Continues on PTA Lasix 20 mg MWF    Chronic atrial fibrillation s/p PPM  [PTA: diltiazem  mg daily, rivaroxaban 15 mg daily]  - Continues on PTA meds    CKD stage III  Creatinine has been stable within 1.2-1.6 range  - Will follow BMP    History of PE  - Continues on PTA rivaroxaban    Gout  Chronic and stable on allopurinol    Glaucoma  Chronic and stable on PTA eye drops    Dementia with behavioral disturbance  No problems with agitation/delirium this hospital stay  - Continues on PTA escitalopram    FEN: 2 gram sodium diet  DVT Prophylaxis: Rivaroxaban  Code Status: DNR    Disposition: Expected discharge to TCU tomorrow if wheezing continues to improve now that she has been transitioned to oral steroids    Harleen Lopez    Interval History   No events overnight. Reports diffuse chest heaviness. Denies shortness of breath, dizziness/lightheadedness.   - Continue antibiotics  - Resume PTA Lasix   - d/c IV methylprednisolone and start prednisone  - Daughter-in-law/POA updated over the phone  - Anticipate TCU tomorrow    -Data reviewed today: I reviewed all new labs and imaging results over the last 24 hours. I personally reviewed the TTE image(s) showing LVEF 50-55% with mid anteroseptal hypokinesis.    Physical Exam   Temp: 97.2  F (36.2  C) Temp src: Oral BP: 149/70 Pulse: 60   Resp: 16 SpO2: 96 % O2 Device: None (Room air)    Vitals:    04/24/19 1017 04/25/19 0500 04/26/19 0500   Weight: 69.4 kg (153 lb) 68.1 kg (150 lb 2.1 oz) 69.4 kg (153 lb)     Vital Signs with Ranges  Temp:  [97.2  F (36.2  C)-97.7  F (36.5  C)] 97.2  F (36.2  C)  Pulse:  [60-67] 60  Resp:  [16] 16  BP: (131-157)/(61-70) 149/70  SpO2:  [93 %-96 %] 96 %  I/O last 3 completed shifts:  In: 600 [P.O.:600]  Out: 425 [Urine:425]    Constitutional: Appears comfortable, NAD  Respiratory: Breathing non-labored. Mild wheezing bilaterally  Cardiovascular: Heart RRR, no  m/r/g. No pedal edema  GI: +BS, abd soft/NT  Skin/Integumen: No rash  Neuro: Alert and appropriate, incoherent at times. ALONSO  Psych: Calm and cooperative    Medications     - MEDICATION INSTRUCTIONS -         allopurinol  100 mg Oral Daily     azithromycin  250 mg Oral Daily     cefTRIAXone  1 g Intravenous Q24H     diltiazem ER  120 mg Oral Daily     escitalopram  5 mg Oral QPM     fluticasone  1 puff Inhalation Daily     furosemide  20 mg Oral Q Mon Wed Fri AM     ipratropium - albuterol 0.5 mg/2.5 mg/3 mL  3 mL Nebulization 4x daily     latanoprost  1 drop Both Eyes At Bedtime     [START ON 4/27/2019] predniSONE  40 mg Oral Daily    Followed by     [START ON 4/30/2019] predniSONE  20 mg Oral Daily    Followed by     [START ON 5/3/2019] predniSONE  10 mg Oral Daily    Followed by     [START ON 5/6/2019] predniSONE  5 mg Oral Daily     predniSONE  40 mg Oral BID w/meals     rivaroxaban ANTICOAGULANT  15 mg Oral Daily     simvastatin  10 mg Oral At Bedtime     Data   Recent Labs   Lab 04/26/19  0739 04/25/19  1949 04/25/19  1606 04/25/19  0728 04/24/19  1024   WBC  --   --   --  6.3 11.4*   HGB  --   --   --  11.4* 11.5*   MCV  --   --   --  91 92   PLT  --   --   --  214 227     --   --  140 137   POTASSIUM 4.4  --   --  4.2 4.0   CHLORIDE 103  --   --  109 104   CO2 23  --   --  21 22   BUN 54*  --   --  43* 43*   CR 1.43*  --   --  1.23* 1.62*   ANIONGAP 7  --   --  10 11   SANDRA 8.7  --   --  8.7 8.9   *  --   --  173* 135*   ALBUMIN 2.7*  --   --  2.7* 3.0*   PROTTOTAL 6.7*  --   --  6.8 7.2   BILITOTAL 0.3  --   --  0.4 0.7   ALKPHOS 107  --   --  124 121   ALT 61*  --   --  51* 42   AST 45  --   --  46* 43   TROPI  --  <0.015 <0.015  --  0.019       Recent Results (from the past 24 hour(s))   XR Chest Port 1 View    Narrative    CHEST PORTABLE ONE VIEW     4/25/2019 7:26 PM     INDICATION: Dyspnea.    COMPARISON: 4/24/2019 at 1059 hours.      Impression    IMPRESSION: The small airspace opacity in  the right upper lung is less  prominent and mostly linear likely due to a small area of atelectasis  or resolving infiltrate. Otherwise no significant change. Stable small  left pleural effusion and adjacent atelectasis or infiltrate.  Cardiomegaly and mitral annular calcification. Cardiac pacer.     ARISTEO PEÑA MD

## 2019-04-27 ENCOUNTER — MEDICAL CORRESPONDENCE (OUTPATIENT)
Dept: HEALTH INFORMATION MANAGEMENT | Facility: CLINIC | Age: 84
End: 2019-04-27

## 2019-04-27 LAB
ANION GAP SERPL CALCULATED.3IONS-SCNC: 9 MMOL/L (ref 3–14)
BUN SERPL-MCNC: 57 MG/DL (ref 7–30)
CALCIUM SERPL-MCNC: 8.8 MG/DL (ref 8.5–10.1)
CHLORIDE SERPL-SCNC: 104 MMOL/L (ref 94–109)
CO2 SERPL-SCNC: 21 MMOL/L (ref 20–32)
CREAT SERPL-MCNC: 1.57 MG/DL (ref 0.52–1.04)
ERYTHROCYTE [DISTWIDTH] IN BLOOD BY AUTOMATED COUNT: 14.5 % (ref 10–15)
GFR SERPL CREATININE-BSD FRML MDRD: 28 ML/MIN/{1.73_M2}
GLUCOSE SERPL-MCNC: 203 MG/DL (ref 70–99)
HCT VFR BLD AUTO: 31.7 % (ref 35–47)
HGB BLD-MCNC: 10.9 G/DL (ref 11.7–15.7)
MCH RBC QN AUTO: 30.5 PG (ref 26.5–33)
MCHC RBC AUTO-ENTMCNC: 34.4 G/DL (ref 31.5–36.5)
MCV RBC AUTO: 89 FL (ref 78–100)
PLATELET # BLD AUTO: 222 10E9/L (ref 150–450)
POTASSIUM SERPL-SCNC: 4 MMOL/L (ref 3.4–5.3)
RBC # BLD AUTO: 3.57 10E12/L (ref 3.8–5.2)
SODIUM SERPL-SCNC: 134 MMOL/L (ref 133–144)
WBC # BLD AUTO: 12.6 10E9/L (ref 4–11)

## 2019-04-27 PROCEDURE — 25000132 ZZH RX MED GY IP 250 OP 250 PS 637: Performed by: INTERNAL MEDICINE

## 2019-04-27 PROCEDURE — A9270 NON-COVERED ITEM OR SERVICE: HCPCS | Performed by: INTERNAL MEDICINE

## 2019-04-27 PROCEDURE — 94640 AIRWAY INHALATION TREATMENT: CPT

## 2019-04-27 PROCEDURE — 94640 AIRWAY INHALATION TREATMENT: CPT | Mod: 76

## 2019-04-27 PROCEDURE — 99232 SBSQ HOSP IP/OBS MODERATE 35: CPT | Performed by: INTERNAL MEDICINE

## 2019-04-27 PROCEDURE — 85027 COMPLETE CBC AUTOMATED: CPT | Performed by: INTERNAL MEDICINE

## 2019-04-27 PROCEDURE — 12000000 ZZH R&B MED SURG/OB

## 2019-04-27 PROCEDURE — 25000131 ZZH RX MED GY IP 250 OP 636 PS 637: Performed by: INTERNAL MEDICINE

## 2019-04-27 PROCEDURE — 80048 BASIC METABOLIC PNL TOTAL CA: CPT | Performed by: INTERNAL MEDICINE

## 2019-04-27 PROCEDURE — 36415 COLL VENOUS BLD VENIPUNCTURE: CPT | Performed by: INTERNAL MEDICINE

## 2019-04-27 PROCEDURE — 25000125 ZZHC RX 250: Performed by: INTERNAL MEDICINE

## 2019-04-27 PROCEDURE — 40000275 ZZH STATISTIC RCP TIME EA 10 MIN

## 2019-04-27 RX ADMIN — PREDNISONE 40 MG: 20 TABLET ORAL at 09:47

## 2019-04-27 RX ADMIN — IPRATROPIUM BROMIDE AND ALBUTEROL SULFATE 3 ML: .5; 3 SOLUTION RESPIRATORY (INHALATION) at 15:41

## 2019-04-27 RX ADMIN — CEFUROXIME AXETIL 500 MG: 500 TABLET ORAL at 09:47

## 2019-04-27 RX ADMIN — LATANOPROST 1 DROP: 50 SOLUTION/ DROPS OPHTHALMIC at 20:53

## 2019-04-27 RX ADMIN — AZITHROMYCIN MONOHYDRATE 250 MG: 250 TABLET ORAL at 09:47

## 2019-04-27 RX ADMIN — IPRATROPIUM BROMIDE AND ALBUTEROL SULFATE 3 ML: .5; 3 SOLUTION RESPIRATORY (INHALATION) at 11:25

## 2019-04-27 RX ADMIN — IPRATROPIUM BROMIDE AND ALBUTEROL SULFATE 3 ML: .5; 3 SOLUTION RESPIRATORY (INHALATION) at 19:30

## 2019-04-27 RX ADMIN — RIVAROXABAN 15 MG: 15 TABLET, FILM COATED ORAL at 09:47

## 2019-04-27 RX ADMIN — FLUTICASONE FUROATE 1 PUFF: 100 POWDER RESPIRATORY (INHALATION) at 09:48

## 2019-04-27 RX ADMIN — SIMVASTATIN 10 MG: 10 TABLET, FILM COATED ORAL at 20:53

## 2019-04-27 RX ADMIN — CEFUROXIME AXETIL 500 MG: 500 TABLET ORAL at 20:52

## 2019-04-27 RX ADMIN — DILTIAZEM HYDROCHLORIDE 120 MG: 120 CAPSULE, EXTENDED RELEASE ORAL at 09:47

## 2019-04-27 RX ADMIN — ESCITALOPRAM 5 MG: 5 TABLET, FILM COATED ORAL at 20:52

## 2019-04-27 RX ADMIN — IPRATROPIUM BROMIDE AND ALBUTEROL SULFATE 3 ML: .5; 3 SOLUTION RESPIRATORY (INHALATION) at 07:16

## 2019-04-27 RX ADMIN — ALLOPURINOL 100 MG: 100 TABLET ORAL at 09:47

## 2019-04-27 ASSESSMENT — ACTIVITIES OF DAILY LIVING (ADL)
ADLS_ACUITY_SCORE: 22

## 2019-04-27 NOTE — PLAN OF CARE
DATE & TIME: 4/26-27 7530-0348  ORIENTATION: A&Ox2 - disoriented to time/situation   BEHAVIOR & AGGRESSION TOOL COLOR: Green  CIWA SCORE: n/a  ABNL VS/O2: VSS on RA  MOBILITY: Up with assist of 1 with walker/gait belt   PAIN MANAGMENT: Denies   DIET: 2 gram Na, no red meat  BOWEL/BLADDER: Bedside commode   ABNL LAB/BG: n/a  DRAIN/DEVICES: n/a  TELEMETRY RHYTHM: n/a  SKIN: blanchable redness to coccyx   TESTS/PROCEDURES: n/a  D/C DAY/GOALS/PLACE: Possible discharge to TCU today   OTHER IMPORTANT INFO: Pt has expiratory wheezing, albuterol INH given. Dyspnea on exertion.

## 2019-04-27 NOTE — PROGRESS NOTES
SW:  D:  Patient has been clinically accepted by [facility].  Joy in admissions at [facility] was left a message that patient is not discharging today.  It is anticipated [facility] will have a Sunday vacancy.

## 2019-04-27 NOTE — PLAN OF CARE
ORIENTATION: alert and oriented x2  BEHAVIOR & AGGRESSION TOOL COLOR: greenCIWA SCORE: na  ABNL VS/O2:creat 1.5. Bilateral exp. Wheezing. NPC. S/ sob with exertion,sats wnl on room air  MOBILITY: up with1/walker/belt  PAIN MANAGMENT: denies  DIET: 2 gram sodium diet, good appetite  BOWEL/BLADDER: Incontinent at times  ABNL LAB/BG:  DRAIN/DEVICES:  TELEMETRY RHYTHM:  SKIN:Intact,blanchable redness on coccyxTESTS/PROCEDURES:na  D/C DAY/GOALS/PLACE: ? Discharge to TCU in am  OTHER IMPORTANT INFO:

## 2019-04-27 NOTE — PROGRESS NOTES
[facility]    Medicine Progress Note - Hospitalist Service        Date of Admission:  4/24/2019 10:07 AM    Assessment & Plan:   Swati Solis is a 89 year old female recently moved to [place] from [place] with hx of dementia, CHF, chronic a-fib s/p PPM and prior PE on chronic anticoagulation with rivaroxaban, CKD, and asthma/COPD who presented from her care facility on 4/24/2019 with shortness of breath, and was admitted for pneumonia and COPD exacerbation.     Bilateral community acquired bacterial pneumonia with acute asthma exacerbation, Improving  Presented from her long term care facility with progressive shortness of breath. spO2 high 80s to mid 90s on arrival, but has not required supplemental O2. CXR on arrival showed left basilar atelectasis vs consolidation and new opacity in the right mid to upper lung. On admission, she was initiated on IV steroids, duonebs, and antibiotics (ceftriaxone and azithromycin) with improvement.  -Continues on Ceftin and azithromycin  -Continue prednisone  -Continues on duonebs QID  -Encourage incentive spirometry  -Off oxygen however patient very wheezy this morning, would like to watch her for the next 24 hours in the hospital.    Atypical chest pain  Likely due to pneumonia/asthma exacerbation. Reported chest heaviness at rest during her hospitalization. EKG showed v-paced rhythm and serial troponins were negative. TTE (4/25) showed LVEF 50-55% with mid anteroseptal hypokinesis, but no prior echo for comparision. Doubt new WMA given negative troponins.   -Consider outpatient stress test once she recovers from pneumonia, had a long discussion with Ruth, her daughter-in-law and power of  this morning, regarding implications of further testing and what we might be potentially looking at possible undiagnosed coronary artery disease.  More than likely she will lean towards not pursuing any further work-up regarding this. She will give me final  "decision by tomorrow after discussing with the patient's son.  -Not on aspirin due to chronic anticoagulation with rivaroxaban  -Resume lovastatin at discharge    History of chronic systolic and diastolic CHF  TTE (4/25) showed LVEF 50-55% with mid anteroseptal hypokinesis and indeterminate diastolic function  -Appears euvolemic  -Continues on PTA Lasix 20 mg MWF     Chronic atrial fibrillation s/p PPM  [PTA: diltiazem  mg daily, rivaroxaban 15 mg daily]  -Continues on PTA meds     CKD stage III  Creatinine has been stable within 1.2-1.6 range     History of PE  -Continues on PTA rivaroxaban     Gout  -Chronic and stable on allopurinol     Glaucoma  -Chronic and stable on PTA eye drops     Dementia with behavioral disturbance  No problems with agitation/delirium this hospital stay  -Continues on PTA escitalopram          Diet: 2 Gram Sodium Diet No Red Meat  Room Service     DVT Prophylaxis: xarelto   Gregg Catheter: not present  Code Status: DNR     Disposition Plan    Expected discharge: Tomorrow, recommended to rule out if her wheezing and respiratory status continues to improve.    Entered: Govind Messina MD 04/27/2019, 9:29 AM        The patient's care was discussed with the Bedside Nurse, Patient and Patient's Family.    Govind Messina MD  Hospitalist Service  [facility]    ______________________________________________________________________    Interval History   Feels slightly better overall however very wheezy this morning.  Denies chest pain this morning.  Off oxygen.  Cough without expectoration.    Data reviewed today: I reviewed all medications, new labs and imaging results over the last 24 hours. I personally reviewed no images or EKG's today.    Physical Exam   Vital signs:  Temp: 97.5  F (36.4  C) Temp src: Oral BP: 144/75 Pulse: 64   Resp: 18 SpO2: 98 % O2 Device: None (Room air)   Height: 160 cm (5' 3\") Weight: 69.4 kg (153 lb)  Estimated body mass index is 27.1 kg/m  " "as calculated from the following:    Height as of this encounter: 1.6 m (5' 3\").    Weight as of this encounter: 69.4 kg (153 lb).      Wt Readings from Last 2 Encounters:   04/26/19 69.4 kg (153 lb)       Gen: AAOX2; forgetful, calm  HEENT: Supple neck, moist oral mucosa, no pallor  Resp: Diffuse wheezing on all the lung fields bilaterally, normal effort of breathing.  CVS: RRR, no murmur  Abd/GI: Soft, non-tender. BS- normoactive.    Skin: Warm, dry no rashes  MSK: No joint deformities, no pedal edema  Neuro- CN- intact. No focal deficits.        Data   Recent Labs   Lab 04/27/19  0830 04/26/19  0739 04/25/19  1949 04/25/19  1606 04/25/19  0728 04/24/19  1024   WBC 12.6*  --   --   --  6.3 11.4*   HGB 10.9*  --   --   --  11.4* 11.5*   MCV 89  --   --   --  91 92     --   --   --  214 227    133  --   --  140 137   POTASSIUM 4.0 4.4  --   --  4.2 4.0   CHLORIDE 104 103  --   --  109 104   CO2 21 23  --   --  21 22   BUN 57* 54*  --   --  43* 43*   CR 1.57* 1.43*  --   --  1.23* 1.62*   ANIONGAP 9 7  --   --  10 11   SANDRA 8.8 8.7  --   --  8.7 8.9   * 185*  --   --  173* 135*   ALBUMIN  --  2.7*  --   --  2.7* 3.0*   PROTTOTAL  --  6.7*  --   --  6.8 7.2   BILITOTAL  --  0.3  --   --  0.4 0.7   ALKPHOS  --  107  --   --  124 121   ALT  --  61*  --   --  51* 42   AST  --  45  --   --  46* 43   TROPI  --   --  <0.015 <0.015  --  0.019       No results found for this or any previous visit (from the past 24 hour(s)).  Medications     - MEDICATION INSTRUCTIONS -         allopurinol  100 mg Oral Daily     azithromycin  250 mg Oral Daily     cefuroxime  500 mg Oral Q12H JENNIFER     diltiazem ER  120 mg Oral Daily     escitalopram  5 mg Oral QPM     fluticasone  1 puff Inhalation Daily     furosemide  20 mg Oral Q Mon Wed Fri AM     ipratropium - albuterol 0.5 mg/2.5 mg/3 mL  3 mL Nebulization 4x daily     latanoprost  1 drop Both Eyes At Bedtime     predniSONE  40 mg Oral Daily    Followed by     [START ON " 4/30/2019] predniSONE  20 mg Oral Daily    Followed by     [START ON 5/3/2019] predniSONE  10 mg Oral Daily    Followed by     [START ON 5/6/2019] predniSONE  5 mg Oral Daily     rivaroxaban ANTICOAGULANT  15 mg Oral Daily     simvastatin  10 mg Oral At Bedtime

## 2019-04-28 VITALS
HEIGHT: 63 IN | WEIGHT: 150.4 LBS | SYSTOLIC BLOOD PRESSURE: 151 MMHG | BODY MASS INDEX: 26.65 KG/M2 | HEART RATE: 65 BPM | DIASTOLIC BLOOD PRESSURE: 77 MMHG | OXYGEN SATURATION: 95 % | RESPIRATION RATE: 16 BRPM | TEMPERATURE: 97.9 F

## 2019-04-28 VITALS — WEIGHT: 156 LBS | BODY MASS INDEX: 27.63 KG/M2

## 2019-04-28 LAB
ANION GAP SERPL CALCULATED.3IONS-SCNC: 11 MMOL/L (ref 3–14)
BUN SERPL-MCNC: 65 MG/DL (ref 7–30)
CALCIUM SERPL-MCNC: 9.2 MG/DL (ref 8.5–10.1)
CHLORIDE SERPL-SCNC: 101 MMOL/L (ref 94–109)
CO2 SERPL-SCNC: 21 MMOL/L (ref 20–32)
CREAT SERPL-MCNC: 1.76 MG/DL (ref 0.52–1.04)
GFR SERPL CREATININE-BSD FRML MDRD: 24 ML/MIN/{1.73_M2}
GLUCOSE SERPL-MCNC: 194 MG/DL (ref 70–99)
POTASSIUM SERPL-SCNC: 4.6 MMOL/L (ref 3.4–5.3)
SODIUM SERPL-SCNC: 133 MMOL/L (ref 133–144)

## 2019-04-28 PROCEDURE — A9270 NON-COVERED ITEM OR SERVICE: HCPCS | Performed by: INTERNAL MEDICINE

## 2019-04-28 PROCEDURE — 94640 AIRWAY INHALATION TREATMENT: CPT

## 2019-04-28 PROCEDURE — 25000132 ZZH RX MED GY IP 250 OP 250 PS 637: Performed by: INTERNAL MEDICINE

## 2019-04-28 PROCEDURE — 94640 AIRWAY INHALATION TREATMENT: CPT | Mod: 76

## 2019-04-28 PROCEDURE — 80048 BASIC METABOLIC PNL TOTAL CA: CPT | Performed by: INTERNAL MEDICINE

## 2019-04-28 PROCEDURE — 40000275 ZZH STATISTIC RCP TIME EA 10 MIN

## 2019-04-28 PROCEDURE — 25000131 ZZH RX MED GY IP 250 OP 636 PS 637: Performed by: INTERNAL MEDICINE

## 2019-04-28 PROCEDURE — 99239 HOSP IP/OBS DSCHRG MGMT >30: CPT | Performed by: INTERNAL MEDICINE

## 2019-04-28 PROCEDURE — 25000125 ZZHC RX 250: Performed by: INTERNAL MEDICINE

## 2019-04-28 PROCEDURE — 36415 COLL VENOUS BLD VENIPUNCTURE: CPT | Performed by: INTERNAL MEDICINE

## 2019-04-28 RX ORDER — PREDNISONE 10 MG/1
10 TABLET ORAL DAILY
DISCHARGE
Start: 2019-05-03 | End: 2019-05-09

## 2019-04-28 RX ORDER — PREDNISONE 20 MG/1
40 TABLET ORAL DAILY
DISCHARGE
Start: 2019-04-29 | End: 2019-05-04 | Stop reason: DRUGHIGH

## 2019-04-28 RX ORDER — PREDNISONE 5 MG/1
5 TABLET ORAL DAILY
DISCHARGE
Start: 2019-05-06 | End: 2019-05-09

## 2019-04-28 RX ORDER — CEFUROXIME AXETIL 500 MG/1
500 TABLET ORAL EVERY 12 HOURS
DISCHARGE
Start: 2019-04-28 | End: 2019-05-04

## 2019-04-28 RX ORDER — AMOXICILLIN 250 MG
1 CAPSULE ORAL 2 TIMES DAILY PRN
DISCHARGE
Start: 2019-04-28

## 2019-04-28 RX ORDER — PREDNISONE 20 MG/1
20 TABLET ORAL DAILY
DISCHARGE
Start: 2019-04-30 | End: 2019-05-04 | Stop reason: DRUGHIGH

## 2019-04-28 RX ADMIN — FLUTICASONE FUROATE 1 PUFF: 100 POWDER RESPIRATORY (INHALATION) at 08:47

## 2019-04-28 RX ADMIN — DILTIAZEM HYDROCHLORIDE 120 MG: 120 CAPSULE, EXTENDED RELEASE ORAL at 08:46

## 2019-04-28 RX ADMIN — CEFUROXIME AXETIL 500 MG: 500 TABLET ORAL at 08:46

## 2019-04-28 RX ADMIN — IPRATROPIUM BROMIDE AND ALBUTEROL SULFATE 3 ML: .5; 3 SOLUTION RESPIRATORY (INHALATION) at 12:30

## 2019-04-28 RX ADMIN — RIVAROXABAN 15 MG: 15 TABLET, FILM COATED ORAL at 08:46

## 2019-04-28 RX ADMIN — IPRATROPIUM BROMIDE AND ALBUTEROL SULFATE 3 ML: .5; 3 SOLUTION RESPIRATORY (INHALATION) at 08:07

## 2019-04-28 RX ADMIN — AZITHROMYCIN MONOHYDRATE 250 MG: 250 TABLET ORAL at 08:46

## 2019-04-28 RX ADMIN — PREDNISONE 40 MG: 20 TABLET ORAL at 08:46

## 2019-04-28 RX ADMIN — ALLOPURINOL 100 MG: 100 TABLET ORAL at 08:46

## 2019-04-28 ASSESSMENT — ACTIVITIES OF DAILY LIVING (ADL)
ADLS_ACUITY_SCORE: 22

## 2019-04-28 ASSESSMENT — MIFFLIN-ST. JEOR
SCORE: 700.71
SCORE: 1051.34

## 2019-04-28 NOTE — PLAN OF CARE
Physical Therapy Discharge Summary    Reason for therapy discharge:    Discharged to transitional care facility.    Progress towards therapy goal(s). See goals on Care Plan in [facility] electronic health record for goal details.  Goals not met.  Barriers to achieving goals:   discharge from facility.    Therapy recommendation(s):    Continued therapy is recommended.  Rationale/Recommendations:  to progress strength, balance and mobility and safety prior to return home. . **Pt not seen by discharging therapist on this date, note written based on previous treating therapist's notes and recommendations.

## 2019-04-28 NOTE — PLAN OF CARE
Occupational Therapy Discharge Summary    Reason for therapy discharge:    Discharged to transitional care facility.    Progress towards therapy goal(s). See goals on Care Plan in [facility] electronic health record for goal details.  Goals partially met.  Barriers to achieving goals:   discharge from facility.    Therapy recommendation(s):    Continued therapy is recommended.  Rationale/Recommendations:  dc to TCU to work on strength and independence toward PLOF.

## 2019-04-28 NOTE — PROGRESS NOTES
SW:  D:  Discharge today was coordinated with patient, her daughter in law Ruth and granddtr in law Ann [phone]. Time coordinated with [facility]. ([id number])  Orders sent thru [employer].   Patient transported via w/c thru [facility].  PA approved.  Effective date: 4/28/19  PA reference #: [id number]  Pt. notified:  Daughter in law Ruth

## 2019-04-28 NOTE — PLAN OF CARE
DATE & TIME: 4/27-28 [id number]  ORIENTATION: A&Ox2, calm, cooperative   BEHAVIOR & AGGRESSION TOOL COLOR: Green  CIWA SCORE: n/a  ABNL VS/O2: VSS on RA   MOBILITY: up with assist of 1, walker/gait belt  PAIN MANAGMENT: Denied  DIET: 2 gram Na, no red meat   BOWEL/BLADDER: Up to bedside commode   ABNL LAB/BG: Cr 1.57  DRAIN/DEVICES: n/a  TELEMETRY RHYTHM: n/a  SKIN: intact, blanchable redness to coccyx   TESTS/PROCEDURES: n/a   D/C DAY/GOALS/PLACE: Discharge to TCU today.

## 2019-04-28 NOTE — DISCHARGE SUMMARY
[facility]    Discharge Summary  Hospitalist    Date of Admission:  4/24/2019  Date of Discharge:  4/28/2019  Discharging Provider: Govind Messina MD    Discharge Diagnoses      Bilateral community acquired bacterial pneumonia with acute asthma exacerbation   Atypical chest pain  History of chronic systolic and diastolic CHF  Chronic atrial fibrillation s/p PPM  CKD stage III  History of PE  Gout  Glaucoma  Dementia     Hospital Course:    Swati Solis is a 89 year old female recently moved to [place] from [place] with hx of dementia, CHF, chronic a-fib s/p PPM and prior PE on chronic anticoagulation with rivaroxaban, CKD, and asthma/COPD who presented from her care facility on 4/24/2019 with shortness of breath, and was admitted for pneumonia and COPD exacerbation.     Bilateral community acquired bacterial pneumonia with acute asthma exacerbation, Improving  Presented from her long term care facility with progressive shortness of breath. spO2 high 80s to mid 90s on arrival, but has not required supplemental O2. CXR on arrival showed left basilar atelectasis vs consolidation and new opacity in the right mid to upper lung. On admission, she was initiated on IV steroids, duonebs, and antibiotics (ceftriaxone and azithromycin) with improvement.  -Continues on Ceftin  for 5 more days at discharge.  Completed course of azithromycin  -Continue prednisone taper  -Off oxygen and improving.     Atypical chest pain  Likely due to pneumonia/asthma exacerbation. Reported chest heaviness at rest during her hospitalization. EKG showed v-paced rhythm and serial troponins were negative. TTE (4/25) showed LVEF 50-55% with mid anteroseptal hypokinesis, but no prior echo for comparision.   -Had a long discussion with since daughter in law, Delia.  Mention to her that wall motion abnormality on echo could mean undiagnosed coronary artery disease.  Discussed that further option would be doing a stress test and  if that is abnormal she would need coronary angiogram.  Delia mentioned that Luba would not want such interventions.  Therefore no further testing was pursued at this time.  -She did not have any chest pain per se, it appeared like her symptoms were due to congestion from asthma and pneumonia.      History of chronic systolic and diastolic CHF  TTE (4/25) showed LVEF 50-55% with mid anteroseptal hypokinesis and indeterminate diastolic function  -Appears euvolemic  -I have held her Lasix for now.  If her renal function is stable on recheck in 3 to 4 days, she can go back to her prior to admission regimen of Monday Wednesday and Friday     Chronic atrial fibrillation s/p PPM  [PTA: diltiazem  mg daily, rivaroxaban 15 mg daily]  -Continues on Cardizem.  Rivaroxaban changed to apixaban due to contraindication based on her GFR(this was discussed with pharmacy)     CKD stage III  Creatinine has been stable within 1.2-1.6 range  -Commend rechecking creatinine in 3 to 4 days at TCU.     History of PE  -Xarelto changed to apixaban.    Govind Messina MD    Significant Results and Procedures   See below    Pending Results     Unresulted Labs Ordered in the Past 30 Days of this Admission     Date and Time Order Name Status Description    4/24/2019 1225 Blood culture Preliminary     4/24/2019 1225 Blood culture Preliminary           Code Status   DNR       Primary Care Physician   Hanna Samuels    Physical Exam   Temp: 97.9  F (36.6  C) Temp src: Oral BP: 151/77 Pulse: 65   Resp: 16 SpO2: 94 % O2 Device: None (Room air)      Constitutional: Awake, alert, forgetful.  Respiratory: 1-2 scattered wheezes, normal effort of breathing  Cardiovascular: RRR, No murmur  GI: Soft, Non- tender, BS- normoactive  Neuro: CN- grossly intact    Discharge Disposition   Discharged to short-term care facility  Condition at discharge: Stable    Consultations This Hospital Stay   PHYSICAL THERAPY ADULT IP CONSULT  OCCUPATIONAL THERAPY  ADULT IP CONSULT  SOCIAL WORK IP CONSULT  PHYSICAL THERAPY ADULT IP CONSULT  OCCUPATIONAL THERAPY ADULT IP CONSULT    Time Spent on this Encounter   I, Govind Messina, personally saw the patient today and spent greater than 30 minutes discharging this patient.    Discharge Orders      General info for SNF    Length of Stay Estimate: Short Term Care: Estimated # of Days <30  Condition at Discharge: Improving  Level of care:skilled   Rehabilitation Potential: Good  Admission H&P remains valid and up-to-date: Yes  Recent Chemotherapy: N/A  Use Nursing Home Standing Orders: N/A     Mantoux instructions    Give two-step Mantoux (PPD) Per Facility Policy Yes     Follow Up and recommended labs and tests    Follow up with [facility] physician.  The following labs/tests are recommended: BMP on 5/1. Resume lasix if Cr stable.     Activity - Up with nursing assistance     Reason for your hospital stay    Asthma exacerbation     Physical Therapy Adult Consult    Evaluate and treat as clinically indicated.    Reason:     Occupational Therapy Adult Consult    Evaluate and treat as clinically indicated.    Reason:     Fall precautions     Advance Diet as Tolerated    Follow this diet upon discharge: Orders Placed This Encounter      Room Service      2 Gram Sodium Diet No Red Meat       Discharge Medications   Current Discharge Medication List      START taking these medications    Details   apixaban ANTICOAGULANT (ELIQUIS) 2.5 MG tablet Take 1 tablet (2.5 mg) by mouth 2 times daily  Qty: 60 tablet, Refills: 1    Comments: Future refills by PCP Dr. Hanna Samuels with phone number [phone].  Associated Diagnoses: Chronic atrial fibrillation (H)      cefuroxime (CEFTIN) 500 MG tablet Take 1 tablet (500 mg) by mouth every 12 hours for 5 days    Associated Diagnoses: Community acquired pneumonia, unspecified laterality      !! predniSONE (DELTASONE) 10 MG tablet Take 10 mg by mouth daily.    Comments: Please include the  quantity of tablets and (strength) per dose in sig.  Associated Diagnoses: Exacerbation of asthma, unspecified asthma severity, unspecified whether persistent      !! predniSONE (DELTASONE) 20 MG tablet Take 40 mg by mouth daily.    Comments: Please include the quantity of tablets and (strength) per dose in sig.  Associated Diagnoses: Exacerbation of asthma, unspecified asthma severity, unspecified whether persistent      !! predniSONE (DELTASONE) 20 MG tablet Take 20 mg by mouth daily.    Comments: Please include the quantity of tablets and (strength) per dose in sig.  Associated Diagnoses: Exacerbation of asthma, unspecified asthma severity, unspecified whether persistent      !! predniSONE (DELTASONE) 5 MG tablet Take 5 mg by mouth daily.    Comments: Please include the quantity of tablets and (strength) per dose in sig.  Associated Diagnoses: Exacerbation of asthma, unspecified asthma severity, unspecified whether persistent      senna-docusate (SENOKOT-S/PERICOLACE) 8.6-50 MG tablet Take 1 tablet by mouth 2 times daily as needed for constipation    Associated Diagnoses: Exacerbation of asthma, unspecified asthma severity, unspecified whether persistent       !! - Potential duplicate medications found. Please discuss with provider.      CONTINUE these medications which have NOT CHANGED    Details   acetaminophen (TYLENOL) 325 MG tablet Take 650 mg by mouth every 4 hours as needed for mild pain      albuterol (PROAIR HFA/PROVENTIL HFA/VENTOLIN HFA) 108 (90 Base) MCG/ACT inhaler Inhale 2 puffs into the lungs every 2 hours as needed for shortness of breath / dyspnea or wheezing      allopurinol (ZYLOPRIM) 100 MG tablet Take 100 mg by mouth daily 0730      diltiazem ER (DILT-XR) 120 MG 24 hr capsule Take 120 mg by mouth daily 0800      escitalopram (LEXAPRO) 5 MG tablet Take 5 mg by mouth daily 2000      fluticasone (FLOVENT DISKUS) 50 MCG/BLIST inhaler Inhale 1 puff into the lungs every 12 hours 0800, 2000       latanoprost (XALATAN) 0.005 % ophthalmic solution Place 1 drop into both eyes At Bedtime 2000      lovastatin (MEVACOR) 20 MG tablet Take 20 mg by mouth every evening 1500         STOP taking these medications       furosemide (LASIX) 20 MG tablet Comments:   Reason for Stopping:         rivaroxaban ANTICOAGULANT (XARELTO) 15 MG TABS tablet Comments:   Reason for Stopping:             Allergies   Allergies   Allergen Reactions     Amoxicillin      Data   Most Recent 3 CBC's:  Recent Labs   Lab Test 04/27/19  0830 04/25/19  0728 04/24/19  1024   WBC 12.6* 6.3 11.4*   HGB 10.9* 11.4* 11.5*   MCV 89 91 92    214 227      Most Recent 3 BMP's:  Recent Labs   Lab Test 04/28/19  0930 04/27/19  0830 04/26/19  0739    134 133   POTASSIUM 4.6 4.0 4.4   CHLORIDE 101 104 103   CO2 21 21 23   BUN 65* 57* 54*   CR 1.76* 1.57* 1.43*   ANIONGAP 11 9 7   SANDRA 9.2 8.8 8.7   * 203* 185*     Most Recent 2 LFT's:  Recent Labs   Lab Test 04/26/19  0739 04/25/19  0728   AST 45 46*   ALT 61* 51*   ALKPHOS 107 124   BILITOTAL 0.3 0.4     Most Recent INR's and Anticoagulation Dosing History:  Anticoagulation Dose History     There is no flowsheet data to display.        Most Recent 3 Troponin's:  Recent Labs   Lab Test 04/25/19  1949 04/25/19  1606 04/24/19  1024   TROPI <0.015 <0.015 0.019     Most Recent Cholesterol Panel:No lab results found.  Most Recent 6 Bacteria Isolates From Any Culture (See EPIC Reports for Culture Details):  Recent Labs   Lab Test 04/24/19  1311 04/24/19  1240   CULT No growth after 4 days No growth after 4 days     Most Recent TSH, T4 and A1c Labs:No lab results found.    Results for orders placed or performed during the hospital encounter of 04/24/19   XR Chest 2 Views    Narrative    XR CHEST 2 VW 4/24/2019 11:04 AM    COMPARISON: None.    HISTORY: Shortness of breath and dyspnea on exertion.      Impression    IMPRESSION: Enlarged cardiac silhouette. Likely trace LEFT pleural  effusion.  There is also LEFT basilar atelectasis/consolidation and  airspace opacity in the RIGHT mid to upper lung. No pneumothorax seen  on either side.    FLAQUITO LARSON MD   XR Chest Port 1 View    Narrative    CHEST PORTABLE ONE VIEW     4/25/2019 7:26 PM     INDICATION: Dyspnea.    COMPARISON: 4/24/2019 at 1059 hours.      Impression    IMPRESSION: The small airspace opacity in the right upper lung is less  prominent and mostly linear likely due to a small area of atelectasis  or resolving infiltrate. Otherwise no significant change. Stable small  left pleural effusion and adjacent atelectasis or infiltrate.  Cardiomegaly and mitral annular calcification. Cardiac pacer.     ARISTEO PEÑA MD

## 2019-04-28 NOTE — PROGRESS NOTES
Occupational Therapy Note:    Pt not seen by OT today. Pt discharging to TCU on 4/28, will defer further OT needs to next level of care. OT orders completed.

## 2019-04-29 ENCOUNTER — NURSING HOME VISIT (OUTPATIENT)
Dept: GERIATRICS | Facility: CLINIC | Age: 84
End: 2019-04-29
Payer: MEDICARE

## 2019-04-29 DIAGNOSIS — Z95.0 PACEMAKER: ICD-10-CM

## 2019-04-29 DIAGNOSIS — J18.9 COMMUNITY ACQUIRED PNEUMONIA, UNSPECIFIED LATERALITY: Primary | ICD-10-CM

## 2019-04-29 DIAGNOSIS — I10 ESSENTIAL HYPERTENSION: ICD-10-CM

## 2019-04-29 DIAGNOSIS — R53.81 PHYSICAL DECONDITIONING: ICD-10-CM

## 2019-04-29 DIAGNOSIS — F03.90 DEMENTIA WITHOUT BEHAVIORAL DISTURBANCE, UNSPECIFIED DEMENTIA TYPE: ICD-10-CM

## 2019-04-29 DIAGNOSIS — Z71.89 ADVANCED DIRECTIVES, COUNSELING/DISCUSSION: ICD-10-CM

## 2019-04-29 DIAGNOSIS — I48.20 CHRONIC ATRIAL FIBRILLATION (H): ICD-10-CM

## 2019-04-29 DIAGNOSIS — I50.42 CHRONIC COMBINED SYSTOLIC AND DIASTOLIC HEART FAILURE (H): ICD-10-CM

## 2019-04-29 DIAGNOSIS — F32.A DEPRESSIVE DISORDER: ICD-10-CM

## 2019-04-29 DIAGNOSIS — J45.909 UNCOMPLICATED ASTHMA, UNSPECIFIED ASTHMA SEVERITY, UNSPECIFIED WHETHER PERSISTENT: ICD-10-CM

## 2019-04-29 DIAGNOSIS — N18.30 CKD (CHRONIC KIDNEY DISEASE) STAGE 3, GFR 30-59 ML/MIN (H): ICD-10-CM

## 2019-04-29 PROCEDURE — 99207 ZZC CDG-MDM COMPONENT: MEETS LOW - DOWN CODED: CPT | Performed by: NURSE PRACTITIONER

## 2019-04-29 PROCEDURE — 99309 SBSQ NF CARE MODERATE MDM 30: CPT | Performed by: NURSE PRACTITIONER

## 2019-04-29 NOTE — LETTER
4/29/2019        RE: Swati Solis  [address]  [place] [address]        [facility]  PRIMARY CARE PROVIDER AND CLINIC:  Hanna Samuels MD, [facility] [address] [place] [address]  / [facility] *  Chief Complaint   Patient presents with     Hospital F/U     [facility] Medical Record Number:  [id number]  Place of Service where encounter took place:  [facility] [[id number]]    Swati Solis  is a 89 year old  (12/10/1924), admitted to the above facility from  [facility]. Hospital stay 4- through 4-..  Admitted to this facility for  rehab, medical management and nursing care.    HPI:    HPI information obtained from: facility chart records, facility staff, patient report, [facility] chart review and family/first contact daughter in law Delia Solis report.   Brief Summary of Hospital Course:   She has a medical history significant for systolic and diastolic CHF, afib with pacemaker, chronic anticoagulation,  CKD stage 3-4,  history of PE, asthma, dementia  and was hospitalized from her AL with shortness of breath and hypoxia. CXR showed left basilar atelectasis vs consolidation and opacity mid to upper right lung. She was treated with nebs, IV steroids, ceftriaxone and azithromycin with improvement. Discharged on prednisone taper and Ceftin for 5 days. Weaned off O2 prior to hospital discharge. She noted chest heaviness at rest. EKG with paced rhythm. ECHO: EF 50-55%, mid anteroseptal hypokinesis. Further work up was declined by family. Rivaroxaban was changed to apixaban and lisinopril was discontinued due to her CKD.     Updates on Status Since Skilled nursing Admission:   Community acquired pneumonia, unspecified laterality-afebrile. Reports her breathing is improving and she's feeling better. Occasional cough. No chest pain. Denies trouble chewing or swallowing.   Uncomplicated asthma, unspecified asthma severity,  unspecified whether persistent  Chronic combined systolic and diastolic heart failure (H)  Chronic atrial fibrillation (H)-HR: 62-66  Pacemaker  CKD (chronic kidney disease) stage 3, GFR 30-59 ml/min (H)  Essential hypertension-BPs: 167/81, 153/81, 150/75  Depressive disorder  Dementia without behavioral disturbance, unspecified dementia type-she's aware she was in the hospital with pneumonia. Not able to state where she is currently living. Speech is  difficult to understand at times. Per daughter in law, patient frequently speaks Josh and tends to be confused when she is tired or ill.   Physical deconditioning-uses a walker at baseline. Requires assist of 1 with cares.       CODE STATUS/ADVANCE DIRECTIVES DISCUSSION:   DNR/DNI   Patient's living condition: has lived at [facility] for just a few weeks. She was living in [place] near her son and his wife Delia Solis. Her son  a few months ago and Delia is in the process of moving back to [place].     ALLERGIES: Amoxicillin  PAST MEDICAL HISTORY:  has a past medical history of Asthma, Atrial fibrillation (H), Chronic kidney disease, CKD (chronic kidney disease) stage 3, GFR 30-59 ml/min (H), Depressive disorder, Gout, Heart failure (H), Hypertension, Pacemaker, PE (pulmonary thromboembolism) (H), and Preglaucoma.  PAST SURGICAL HISTORY:   has no past surgical history on file.  FAMILY HISTORY: family history is not on file.  SOCIAL HISTORY:   reports that she has never smoked. She does not have any smokeless tobacco history on file. She reports that she drank alcohol.    Post Discharge Medication Reconciliation Status: discharge medications reconciled, continue medications without change    Current Outpatient Medications   Medication Sig Dispense Refill     acetaminophen (TYLENOL) 325 MG tablet Take 650 mg by mouth every 4 hours as needed for mild pain       albuterol (PROAIR HFA/PROVENTIL HFA/VENTOLIN HFA) 108 (90 Base) MCG/ACT inhaler Inhale 2 puffs into the  lungs every 2 hours as needed for shortness of breath / dyspnea or wheezing       allopurinol (ZYLOPRIM) 100 MG tablet Take 100 mg by mouth daily 0730       apixaban ANTICOAGULANT (ELIQUIS) 2.5 MG tablet Take 1 tablet (2.5 mg) by mouth 2 times daily 60 tablet 1     cefuroxime (CEFTIN) 500 MG tablet Take 1 tablet (500 mg) by mouth every 12 hours for 5 days       diltiazem ER (DILT-XR) 120 MG 24 hr capsule Take 120 mg by mouth daily 0800       escitalopram (LEXAPRO) 5 MG tablet Take 5 mg by mouth daily 2000       fluticasone (FLOVENT DISKUS) 50 MCG/BLIST inhaler Inhale 1 puff into the lungs every 12 hours 0800, 2000       latanoprost (XALATAN) 0.005 % ophthalmic solution Place 1 drop into both eyes At Bedtime 2000       lovastatin (MEVACOR) 20 MG tablet Take 20 mg by mouth every evening 1500       [START ON 5/3/2019] predniSONE (DELTASONE) 10 MG tablet Take 10 mg by mouth daily.       predniSONE (DELTASONE) 20 MG tablet Take 40 mg by mouth daily.       [START ON 4/30/2019] predniSONE (DELTASONE) 20 MG tablet Take 20 mg by mouth daily.       [START ON 5/6/2019] predniSONE (DELTASONE) 5 MG tablet Take 5 mg by mouth daily.       senna-docusate (SENOKOT-S/PERICOLACE) 8.6-50 MG tablet Take 1 tablet by mouth 2 times daily as needed for constipation       ROS:  4 point ROS including Respiratory, CV, GI and , other than that noted in the HPI,  is negative    Vitals:  Wt 70.8 kg (156 lb)   BMI 27.63 kg/m     Exam:  GENERAL APPEARANCE:  Alert, in no distress  ENT:  Mouth and posterior oropharynx normal, moist mucous membranes, [place]  EYES:  EOM normal, conjunctiva and lids normal, PERRL  NECK:  No adenopathy,masses or thyromegaly  RESP:  respiratory effort and palpation of chest normal, no respiratory distress, diminished breath sounds bilaterally, no crackles or wheezes  CV:  Palpation and auscultation of heart done , regular rate and rhythm, no murmur, no edema  ABDOMEN:  normal bowel sounds, soft, nontender, no  hepatosplenomegaly or other masses  M/S:   in bed. ALONSO with good strength. No joint inflammation  SKIN:  no rashes or open areas  PSYCH:  oriented to self, situation, insight and judgement impaired, memory impaired , affect and mood normal    Lab/Diagnostic data:  Lab Results   Component Value Date    WBC 12.6 04/27/2019     Lab Results   Component Value Date    RBC 3.57 04/27/2019     Lab Results   Component Value Date    HGB 10.9 04/27/2019     Lab Results   Component Value Date    HCT 31.7 04/27/2019     Lab Results   Component Value Date    MCV 89 04/27/2019     Lab Results   Component Value Date    MCH 30.5 04/27/2019     Lab Results   Component Value Date    MCHC 34.4 04/27/2019     Lab Results   Component Value Date    RDW 14.5 04/27/2019     Lab Results   Component Value Date     04/27/2019     Last Comprehensive Metabolic Panel:  Sodium   Date Value Ref Range Status   04/28/2019 133 133 - 144 mmol/L Final     Potassium   Date Value Ref Range Status   04/28/2019 4.6 3.4 - 5.3 mmol/L Final     Chloride   Date Value Ref Range Status   04/28/2019 101 94 - 109 mmol/L Final     Carbon Dioxide   Date Value Ref Range Status   04/28/2019 21 20 - 32 mmol/L Final     Anion Gap   Date Value Ref Range Status   04/28/2019 11 3 - 14 mmol/L Final     Glucose   Date Value Ref Range Status   04/28/2019 194 (H) 70 - 99 mg/dL Final     Urea Nitrogen   Date Value Ref Range Status   04/28/2019 65 (H) 7 - 30 mg/dL Final     Creatinine   Date Value Ref Range Status   04/28/2019 1.76 (H) 0.52 - 1.04 mg/dL Final     GFR Estimate   Date Value Ref Range Status   04/28/2019 24 (L) >60 mL/min/[1.73_m2] Final     Comment:     Non  GFR Calc  Starting 12/18/2018, serum creatinine based estimated GFR (eGFR) will be   calculated using the Chronic Kidney Disease Epidemiology Collaboration   (CKD-EPI) equation.       Calcium   Date Value Ref Range Status   04/28/2019 9.2 8.5 - 10.1 mg/dL Final         ASSESSMENT /  PLAN:  (J18.9) Community acquired pneumonia, unspecified laterality  (primary encounter diagnosis)  (J45.909) Uncomplicated asthma, unspecified asthma severity, unspecified whether persistent  Comment: respiratory status improving.   Plan: complete 5 day course of Ceftin and prednisone taper. Continue prn albuterol and fluticasone. SPEECH THERAPY eval and treat for swallow and speech. Monitor respiratory status.     (I50.42) Chronic combined systolic and diastolic heart failure (H)  Comment: appears compensated. Not on diuretic.   Plan: daily weight and monitor symptoms     (I48.2) Chronic atrial fibrillation (H)  (Z95.0) Pacemaker  History of PE  Comment: rate controlled  Plan: continue diltiazem, apixaban.     (N18.3) CKD (chronic kidney disease) stage 3, GFR 30-59 ml/min (H)  Comment: renal function at baseline  Plan: follow BMP. Avoid nephrotoxins     (I10) Essential hypertension  Comment: acceptable control, given her advanced age and fall risk. Lisinopril discontinued while inpatient due to CKD.   Plan: continue diltiazem. Monitor VS.     (F32.9) Depressive disorder  Comment: appears well managed  Plan: continue lexapro. Refer to onsite psychologist prn.     (F03.90) Dementia without behavioral disturbance, unspecified dementia type  Comment: appears to have mild to moderate deficits, although difficult to assess due to her speech.   Plan: cognitive testing per therapies. Supportive care    (R53.81) Physical deconditioning  Comment: due to acute illness, hospitalization, multiple comorbidities  Plan: PHYSICAL THERAPY/OT. Goal is to return to [place] [facility]     (Z71.89) Advanced directives, counseling/discussion  Comment: she has a healthcare directive on file and Delia Solis is health care agent. She confirms DNR/DNI  Plan:POLST completed.                 Total time spent with patient visit at the skilled nursing facility was 42 mins including patient visit, review of past records and phone call to  patient contact. Greater than 50% of total time spent with counseling and coordinating care due to complexity of care  Electronically signed by:  JAKE Fraire CNP          Sincerely,        JAKE Fraire CNP

## 2019-04-29 NOTE — PROGRESS NOTES
[facility] [facility]  PRIMARY CARE PROVIDER AND CLINIC:  Hanna Samuels MD, [facility] [address] [facility]  / [facility] *  Chief Complaint   Patient presents with     Hospital F/U     [place] Medical Record Number:  [id number]  Place of Service where encounter took place:  [facility] [[id number]]    Swati Solis  is a 89 year old  (12/10/1924), admitted to the above facility from  [facility]. Hospital stay 4- through 4-..  Admitted to this facility for  rehab, medical management and nursing care.    HPI:    HPI information obtained from: facility chart records, facility staff, patient report, [facility] chart review and family/first contact daughter in law Delia Solis report.   Brief Summary of Hospital Course:   She has a medical history significant for systolic and diastolic CHF, afib with pacemaker, chronic anticoagulation,  CKD stage 3-4,  history of PE, asthma, dementia  and was hospitalized from her AL with shortness of breath and hypoxia. CXR showed left basilar atelectasis vs consolidation and opacity mid to upper right lung. She was treated with nebs, IV steroids, ceftriaxone and azithromycin with improvement. Discharged on prednisone taper and Ceftin for 5 days. Weaned off O2 prior to hospital discharge. She noted chest heaviness at rest. EKG with paced rhythm. ECHO: EF 50-55%, mid anteroseptal hypokinesis. Further work up was declined by family. Rivaroxaban was changed to apixaban and lisinopril was discontinued due to her CKD.     Updates on Status Since Skilled nursing Admission:   Community acquired pneumonia, unspecified laterality-afebrile. Reports her breathing is improving and she's feeling better. Occasional cough. No chest pain. Denies trouble chewing or swallowing.   Uncomplicated asthma, unspecified asthma severity, unspecified whether persistent  Chronic combined systolic and diastolic heart failure  (H)  Chronic atrial fibrillation (H)-HR: 62-66  Pacemaker  CKD (chronic kidney disease) stage 3, GFR 30-59 ml/min (H)  Essential hypertension-BPs: 167/81, 153/81, 150/75  Depressive disorder  Dementia without behavioral disturbance, unspecified dementia type-she's aware she was in the hospital with pneumonia. Not able to state where she is currently living. Speech is  difficult to understand at times. Per daughter in law, patient frequently speaks [place] and tends to be confused when she is tired or ill.   Physical deconditioning-uses a walker at baseline. Requires assist of 1 with cares.       CODE STATUS/ADVANCE DIRECTIVES DISCUSSION:   DNR/DNI   Patient's living condition: has lived at [facility] for just a few weeks. She was living in [place] near her son and his wife Delia Solis. Her son  a few months ago and Delia is in the process of moving back to [place].     ALLERGIES: Amoxicillin  PAST MEDICAL HISTORY:  has a past medical history of Asthma, Atrial fibrillation (H), Chronic kidney disease, CKD (chronic kidney disease) stage 3, GFR 30-59 ml/min (H), Depressive disorder, Gout, Heart failure (H), Hypertension, Pacemaker, PE (pulmonary thromboembolism) (H), and Preglaucoma.  PAST SURGICAL HISTORY:   has no past surgical history on file.  FAMILY HISTORY: family history is not on file.  SOCIAL HISTORY:   reports that she has never smoked. She does not have any smokeless tobacco history on file. She reports that she drank alcohol.    Post Discharge Medication Reconciliation Status: discharge medications reconciled, continue medications without change    Current Outpatient Medications   Medication Sig Dispense Refill     acetaminophen (TYLENOL) 325 MG tablet Take 650 mg by mouth every 4 hours as needed for mild pain       albuterol (PROAIR HFA/PROVENTIL HFA/VENTOLIN HFA) 108 (90 Base) MCG/ACT inhaler Inhale 2 puffs into the lungs every 2 hours as needed for shortness of breath / dyspnea or wheezing        allopurinol (ZYLOPRIM) 100 MG tablet Take 100 mg by mouth daily 0730       apixaban ANTICOAGULANT (ELIQUIS) 2.5 MG tablet Take 1 tablet (2.5 mg) by mouth 2 times daily 60 tablet 1     cefuroxime (CEFTIN) 500 MG tablet Take 1 tablet (500 mg) by mouth every 12 hours for 5 days       diltiazem ER (DILT-XR) 120 MG 24 hr capsule Take 120 mg by mouth daily 0800       escitalopram (LEXAPRO) 5 MG tablet Take 5 mg by mouth daily 2000       fluticasone (FLOVENT DISKUS) 50 MCG/BLIST inhaler Inhale 1 puff into the lungs every 12 hours 0800, 2000       latanoprost (XALATAN) 0.005 % ophthalmic solution Place 1 drop into both eyes At Bedtime 2000       lovastatin (MEVACOR) 20 MG tablet Take 20 mg by mouth every evening 1500       [START ON 5/3/2019] predniSONE (DELTASONE) 10 MG tablet Take 10 mg by mouth daily.       predniSONE (DELTASONE) 20 MG tablet Take 40 mg by mouth daily.       [START ON 4/30/2019] predniSONE (DELTASONE) 20 MG tablet Take 20 mg by mouth daily.       [START ON 5/6/2019] predniSONE (DELTASONE) 5 MG tablet Take 5 mg by mouth daily.       senna-docusate (SENOKOT-S/PERICOLACE) 8.6-50 MG tablet Take 1 tablet by mouth 2 times daily as needed for constipation       ROS:  4 point ROS including Respiratory, CV, GI and , other than that noted in the HPI,  is negative    Vitals:  Wt 70.8 kg (156 lb)   BMI 27.63 kg/m    Exam:  GENERAL APPEARANCE:  Alert, in no distress  ENT:  Mouth and posterior oropharynx normal, moist mucous membranes, [place]  EYES:  EOM normal, conjunctiva and lids normal, PERRL  NECK:  No adenopathy,masses or thyromegaly  RESP:  respiratory effort and palpation of chest normal, no respiratory distress, diminished breath sounds bilaterally, no crackles or wheezes  CV:  Palpation and auscultation of heart done , regular rate and rhythm, no murmur, no edema  ABDOMEN:  normal bowel sounds, soft, nontender, no hepatosplenomegaly or other masses  M/S:   in bed. ALONSO with good strength. No joint  inflammation  SKIN:  no rashes or open areas  PSYCH:  oriented to self, situation, insight and judgement impaired, memory impaired , affect and mood normal    Lab/Diagnostic data:  Lab Results   Component Value Date    WBC 12.6 04/27/2019     Lab Results   Component Value Date    RBC 3.57 04/27/2019     Lab Results   Component Value Date    HGB 10.9 04/27/2019     Lab Results   Component Value Date    HCT 31.7 04/27/2019     Lab Results   Component Value Date    MCV 89 04/27/2019     Lab Results   Component Value Date    MCH 30.5 04/27/2019     Lab Results   Component Value Date    MCHC 34.4 04/27/2019     Lab Results   Component Value Date    RDW 14.5 04/27/2019     Lab Results   Component Value Date     04/27/2019     Last Comprehensive Metabolic Panel:  Sodium   Date Value Ref Range Status   04/28/2019 133 133 - 144 mmol/L Final     Potassium   Date Value Ref Range Status   04/28/2019 4.6 3.4 - 5.3 mmol/L Final     Chloride   Date Value Ref Range Status   04/28/2019 101 94 - 109 mmol/L Final     Carbon Dioxide   Date Value Ref Range Status   04/28/2019 21 20 - 32 mmol/L Final     Anion Gap   Date Value Ref Range Status   04/28/2019 11 3 - 14 mmol/L Final     Glucose   Date Value Ref Range Status   04/28/2019 194 (H) 70 - 99 mg/dL Final     Urea Nitrogen   Date Value Ref Range Status   04/28/2019 65 (H) 7 - 30 mg/dL Final     Creatinine   Date Value Ref Range Status   04/28/2019 1.76 (H) 0.52 - 1.04 mg/dL Final     GFR Estimate   Date Value Ref Range Status   04/28/2019 24 (L) >60 mL/min/[1.73_m2] Final     Comment:     Non  GFR Calc  Starting 12/18/2018, serum creatinine based estimated GFR (eGFR) will be   calculated using the Chronic Kidney Disease Epidemiology Collaboration   (CKD-EPI) equation.       Calcium   Date Value Ref Range Status   04/28/2019 9.2 8.5 - 10.1 mg/dL Final         ASSESSMENT / PLAN:  (J18.9) Community acquired pneumonia, unspecified laterality  (primary encounter  diagnosis)  (J45.909) Uncomplicated asthma, unspecified asthma severity, unspecified whether persistent  Comment: respiratory status improving.   Plan: complete 5 day course of Ceftin and prednisone taper. Continue prn albuterol and fluticasone. SPEECH THERAPY eval and treat for swallow and speech. Monitor respiratory status.     (I50.42) Chronic combined systolic and diastolic heart failure (H)  Comment: appears compensated. Not on diuretic.   Plan: daily weight and monitor symptoms     (I48.2) Chronic atrial fibrillation (H)  (Z95.0) Pacemaker  History of PE  Comment: rate controlled  Plan: continue diltiazem, apixaban.     (N18.3) CKD (chronic kidney disease) stage 3, GFR 30-59 ml/min (H)  Comment: renal function at baseline  Plan: follow BMP. Avoid nephrotoxins     (I10) Essential hypertension  Comment: acceptable control, given her advanced age and fall risk. Lisinopril discontinued while inpatient due to CKD.   Plan: continue diltiazem. Monitor VS.     (F32.9) Depressive disorder  Comment: appears well managed  Plan: continue lexapro. Refer to onsite psychologist prn.     (F03.90) Dementia without behavioral disturbance, unspecified dementia type  Comment: appears to have mild to moderate deficits, although difficult to assess due to her speech.   Plan: cognitive testing per therapies. Supportive care    (R53.81) Physical deconditioning  Comment: due to acute illness, hospitalization, multiple comorbidities  Plan: PHYSICAL THERAPY/OT. Goal is to return to [facility]     (Z71.89) Advanced directives, counseling/discussion  Comment: she has a healthcare directive on file and Delia Solis is health care agent. She confirms DNR/DNI  Plan:POLST completed.                 Total time spent with patient visit at the [facility] nursing [facility] was 42 mins including patient visit, review of past records and phone call to patient contact. Greater than 50% of total time spent with counseling and coordinating care  due to complexity of care  Electronically signed by:  JAKE Fraire CNP

## 2019-04-30 ENCOUNTER — NURSING HOME VISIT (OUTPATIENT)
Dept: GERIATRICS | Facility: CLINIC | Age: 84
End: 2019-04-30
Payer: MEDICARE

## 2019-04-30 VITALS
BODY MASS INDEX: 26.04 KG/M2 | HEART RATE: 64 BPM | RESPIRATION RATE: 14 BRPM | WEIGHT: 147 LBS | SYSTOLIC BLOOD PRESSURE: 125 MMHG | OXYGEN SATURATION: 93 % | TEMPERATURE: 99.5 F | DIASTOLIC BLOOD PRESSURE: 77 MMHG

## 2019-04-30 DIAGNOSIS — R41.89 COGNITIVE IMPAIRMENT: ICD-10-CM

## 2019-04-30 DIAGNOSIS — J45.901 EXACERBATION OF ASTHMA, UNSPECIFIED ASTHMA SEVERITY, UNSPECIFIED WHETHER PERSISTENT: ICD-10-CM

## 2019-04-30 DIAGNOSIS — I48.91 ATRIAL FIBRILLATION, UNSPECIFIED TYPE (H): ICD-10-CM

## 2019-04-30 DIAGNOSIS — N18.30 CKD (CHRONIC KIDNEY DISEASE) STAGE 3, GFR 30-59 ML/MIN (H): ICD-10-CM

## 2019-04-30 DIAGNOSIS — J18.9 COMMUNITY ACQUIRED PNEUMONIA, UNSPECIFIED LATERALITY: Primary | ICD-10-CM

## 2019-04-30 LAB
BACTERIA SPEC CULT: NO GROWTH
BACTERIA SPEC CULT: NO GROWTH
Lab: NORMAL
Lab: NORMAL
SPECIMEN SOURCE: NORMAL
SPECIMEN SOURCE: NORMAL

## 2019-04-30 PROCEDURE — 99304 1ST NF CARE SF/LOW MDM 25: CPT | Performed by: INTERNAL MEDICINE

## 2019-04-30 NOTE — PROGRESS NOTES
"[facility] [facility]  PHYSICIAN NOTE    PRIMARY CARE PROVIDER AND CLINIC:  Hanna Samuels MD, [facility] [address] [place] [address]  / [facility] *    Chief Complaint   Patient presents with     Hospital F/U     [facility] Medical Record Number:  [id number]  Place of Service where encounter took place[facility] [[id number]]    Swati Solis is a 89 year old (12/10/1924), admitted to the above facility from  [facility]. Hospital stay 4/24/19 through 4/28/19. Admitted to this facility for  rehab, medical management and nursing care.     HPI:    HPI information obtained from: facility chart records, patient report and [facility] chart review.     Brief summary of hospital course: Swati \"Luba\" recently moved from [place] to [place] and was admitted with pneumonia in the setting of asthma. Treated with antibiotics, prednisone and inhalers. Had some atypical chest discomfort while hospitalized and echo showing WMA but based on goals of care no invasive testing pursued (see notes).     Updates on status since skilled nursing admission: Luba feels she is doing ok. She talks about aging and that her body is getting old. 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 She wants to \"sleep\" but says she isn't bored. She makes reference to it being a [place] (see below). She feels that she is dyspneic but again blames it on age rather than recent pneumonia with asthma exacerbation. Does recall that she utilizes inhalers. Never was a smoker. Denies chest pain or abdominal pain. Says she is eating ok and \"I'm fat - I've gained 10#!\". She is concerned b/c she is \"forgetful and starting to lose my brain\". Wants to overall focus on comfort towards end of life.     CODE STATUS/ADVANCE DIRECTIVES DISCUSSION:   DNR/DNI   Patient's living condition: has lived at [facility] for just a few weeks. She was living in [place] near her son and " his wife Ruth Solis. Her son  a few months ago and Ruth is in the process of moving back to [place].     ALLERGIES: Amoxicillin    Past Medical History:   Diagnosis Date     Asthma      Atrial fibrillation (H)      Chronic kidney disease      CKD (chronic kidney disease) stage 3, GFR 30-59 ml/min (H)      Depressive disorder      Gout      Heart failure (H)      Hypertension      Pacemaker      PE (pulmonary thromboembolism) (H)      Preglaucoma       No past surgical history on file.     Family history: Non-contributory though son recently passed away.    Social History     Tobacco Use     Smoking status: Never Smoker   Substance Use Topics     Alcohol use: Not Currently     Drug use: Not on file        Post-discharge medication reconciliation status: Meds reviewed per [facility]    Current Outpatient Medications   Medication Sig Dispense Refill     acetaminophen (TYLENOL) 325 MG tablet Take 650 mg by mouth every 4 hours as needed for mild pain       albuterol (PROAIR HFA/PROVENTIL HFA/VENTOLIN HFA) 108 (90 Base) MCG/ACT inhaler Inhale 2 puffs into the lungs every 2 hours as needed for shortness of breath / dyspnea or wheezing       allopurinol (ZYLOPRIM) 100 MG tablet Take 100 mg by mouth daily 0730       apixaban ANTICOAGULANT (ELIQUIS) 2.5 MG tablet Take 1 tablet (2.5 mg) by mouth 2 times daily 60 tablet 1     cefuroxime (CEFTIN) 500 MG tablet Take 1 tablet (500 mg) by mouth every 12 hours for 5 days       diltiazem ER (DILT-XR) 120 MG 24 hr capsule Take 120 mg by mouth daily 0800       escitalopram (LEXAPRO) 5 MG tablet Take 5 mg by mouth daily        fluticasone (FLOVENT DISKUS) 50 MCG/BLIST inhaler Inhale 1 puff into the lungs every 12 hours 0800,        latanoprost (XALATAN) 0.005 % ophthalmic solution Place 1 drop into both eyes At Bedtime        lovastatin (MEVACOR) 20 MG tablet Take 20 mg by mouth every evening 1500       [START ON 5/3/2019] predniSONE (DELTASONE) 10 MG tablet Take 10 mg by mouth  daily.       predniSONE (DELTASONE) 20 MG tablet Take 40 mg by mouth daily.       predniSONE (DELTASONE) 20 MG tablet Take 20 mg by mouth daily.       [START ON 5/6/2019] predniSONE (DELTASONE) 5 MG tablet Take 5 mg by mouth daily.       senna-docusate (SENOKOT-S/PERICOLACE) 8.6-50 MG tablet Take 1 tablet by mouth 2 times daily as needed for constipation         ROS:  Limited secondary to cognitive impairment but today pt reports as per above in HPI.    Exam:  /77   Pulse 64   Temp 99.5  F (37.5  C)   Resp 14   Wt 66.7 kg (147 lb)   SpO2 93%   BMI 26.04 kg/m     Tmax 99.5, -160s/70-80s, HR 60s, O2 sat > 90% RA, Weight down from 156 to 147#  Alert, somewhat witty at times  No scleral icterus  Mouth seems fairly moist  Heart irregularly irregular rate controlled with 3/6 systolic murmur, trace edema  Lungs with faint expiratory wheezes, no cough, crackles nor respiratory distress while lying back in reclining chair  Abdomen soft  Mood slightly down  Has insight into situation and discusses her aging body  Soft spoken, no tremor    Lab/Diagnostic data:  Labs done in SNF are in [place] EPIC. Please refer to them using [employer] Everywhere.  Last Comprehensive Metabolic Panel:  Sodium   Date Value Ref Range Status   04/28/2019 133 133 - 144 mmol/L Final     Potassium   Date Value Ref Range Status   04/28/2019 4.6 3.4 - 5.3 mmol/L Final     Chloride   Date Value Ref Range Status   04/28/2019 101 94 - 109 mmol/L Final     Carbon Dioxide   Date Value Ref Range Status   04/28/2019 21 20 - 32 mmol/L Final     Anion Gap   Date Value Ref Range Status   04/28/2019 11 3 - 14 mmol/L Final     Glucose   Date Value Ref Range Status   04/28/2019 194 (H) 70 - 99 mg/dL Final     Urea Nitrogen   Date Value Ref Range Status   04/28/2019 65 (H) 7 - 30 mg/dL Final     Creatinine   Date Value Ref Range Status   04/28/2019 1.76 (H) 0.52 - 1.04 mg/dL Final     GFR Estimate   Date Value Ref Range Status   04/28/2019 24 (L)  ">60 mL/min/[1.73_m2] Final     Comment:     Non  GFR Calc  Starting 12/18/2018, serum creatinine based estimated GFR (eGFR) will be   calculated using the Chronic Kidney Disease Epidemiology Collaboration   (CKD-EPI) equation.       Calcium   Date Value Ref Range Status   04/28/2019 9.2 8.5 - 10.1 mg/dL Final     Lab Results   Component Value Date    WBC 12.6 04/27/2019     Lab Results   Component Value Date    RBC 3.57 04/27/2019     Lab Results   Component Value Date    HGB 10.9 04/27/2019     Lab Results   Component Value Date    HCT 31.7 04/27/2019     Lab Results   Component Value Date    MCV 89 04/27/2019     Lab Results   Component Value Date    MCH 30.5 04/27/2019     Lab Results   Component Value Date    MCHC 34.4 04/27/2019     Lab Results   Component Value Date    RDW 14.5 04/27/2019     Lab Results   Component Value Date     04/27/2019       ASSESSMENT/PLAN:  Bilateral community acquired pneumonia with underlying asthma exacerbation  Finish Ceftin and prednisone taper; she already completed Azithromycin course  Continue inhaled Fluticasone and PRN albuterol  Speech therapy eval for possible aspiration  Outpatient follow up CXR with PCP if indicated based on goals of care though suspect its not necessary as she wants to focus on comfort    Cognitive impairment  Cognitive testing to be completed in house  Per notes, \"Per daughter in law, patient frequently speaks [place] and tends to be confused when she is tired or ill.\"  We were able to have nice conversation today    Atrial fibrillation with h/o chronic combined systolic and diastolic heart failure and CKD3  Monitor volume status closely though concerned with BUN/Cr ratio that she is actually dry  Advised her that she keep up oral intake  Follow labs weekly or PRN  Continue on home Cardizem  PTA Lasix was dosed MWF but is currently held per hospital discharge which is reasonable   Rivaroxaban changed to apixaban due to " contraindication based on her GFR (this was discussed with pharmacy during hospital stay)    Electronically signed by:  Tara Wilder DO

## 2019-04-30 NOTE — LETTER
"    4/30/2019        RE: Swati Solis  [address] [facility] [address]  [facility] [address]        [place] [facility]  PHYSICIAN NOTE    PRIMARY CARE PROVIDER AND CLINIC:  Hanna Samuels MD, [facility] [address] [facility]  / [facility] *    Chief Complaint   Patient presents with     Hospital F/U     [place] Medical Record Number:  [id number]  Place of Service where encounter took place:  [facility] ([facility]) [[id number]]    Swati Solis is a 89 year old (12/10/1924), admitted to the above facility from  [facility]. Hospital stay 4/24/19 through 4/28/19. Admitted to this facility for  rehab, medical management and nursing care.     HPI:    HPI information obtained from: facility chart records, patient report and [facility] chart review.     Brief summary of hospital course: Swati \"Luba\" recently moved from [place] to [place] and was admitted with pneumonia in the setting of asthma. Treated with antibiotics, prednisone and inhalers. Had some atypical chest discomfort while hospitalized and echo showing WMA but based on goals of care no invasive testing pursued (see notes).     Updates on status since skilled nursing admission: Luba feels she is doing ok. She talks about aging and that her body is getting old. 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 She wants to \"sleep\" but says she isn't bored. She makes reference to it being a challenge [place] her advanced [place] (see below). She feels that she is dyspneic but again blames it on age rather than recent pneumonia with asthma exacerbation. Does recall that she utilizes inhalers. Never was a smoker. Denies chest pain or abdominal pain. Says she is eating ok and \"I'm fat - I've gained 10#!\". She is concerned b/c she is \"forgetful and starting to lose my brain\". Wants to overall focus on comfort towards end of life.     CODE STATUS/ADVANCE DIRECTIVES DISCUSSION:   DNR/DNI   Patient's living condition: " has lived at [facility] for just a few weeks. She was living in [place] near her son and his wife Ruth Solis. Her son  a few months ago and Ruth is in the process of moving back to [place].     ALLERGIES: Amoxicillin    Past Medical History:   Diagnosis Date     Asthma      Atrial fibrillation (H)      Chronic kidney disease      CKD (chronic kidney disease) stage 3, GFR 30-59 ml/min (H)      Depressive disorder      Gout      Heart failure (H)      Hypertension      Pacemaker      PE (pulmonary thromboembolism) (H)      Preglaucoma       No past surgical history on file.     Family history: Non-contributory though son recently passed away.    Social History     Tobacco Use     Smoking status: Never Smoker   Substance Use Topics     Alcohol use: Not Currently     Drug use: Not on file        Post-discharge medication reconciliation status: Meds reviewed per [facility]    Current Outpatient Medications   Medication Sig Dispense Refill     acetaminophen (TYLENOL) 325 MG tablet Take 650 mg by mouth every 4 hours as needed for mild pain       albuterol (PROAIR HFA/PROVENTIL HFA/VENTOLIN HFA) 108 (90 Base) MCG/ACT inhaler Inhale 2 puffs into the lungs every 2 hours as needed for shortness of breath / dyspnea or wheezing       allopurinol (ZYLOPRIM) 100 MG tablet Take 100 mg by mouth daily 0730       apixaban ANTICOAGULANT (ELIQUIS) 2.5 MG tablet Take 1 tablet (2.5 mg) by mouth 2 times daily 60 tablet 1     cefuroxime (CEFTIN) 500 MG tablet Take 1 tablet (500 mg) by mouth every 12 hours for 5 days       diltiazem ER (DILT-XR) 120 MG 24 hr capsule Take 120 mg by mouth daily 0800       escitalopram (LEXAPRO) 5 MG tablet Take 5 mg by mouth daily        fluticasone (FLOVENT DISKUS) 50 MCG/BLIST inhaler Inhale 1 puff into the lungs every 12 hours 0800,        latanoprost (XALATAN) 0.005 % ophthalmic solution Place 1 drop into both eyes At Bedtime        lovastatin (MEVACOR) 20 MG tablet Take 20 mg by mouth every  evening 1500       [START ON 5/3/2019] predniSONE (DELTASONE) 10 MG tablet Take 10 mg by mouth daily.       predniSONE (DELTASONE) 20 MG tablet Take 40 mg by mouth daily.       predniSONE (DELTASONE) 20 MG tablet Take 20 mg by mouth daily.       [START ON 5/6/2019] predniSONE (DELTASONE) 5 MG tablet Take 5 mg by mouth daily.       senna-docusate (SENOKOT-S/PERICOLACE) 8.6-50 MG tablet Take 1 tablet by mouth 2 times daily as needed for constipation         ROS:  Limited secondary to cognitive impairment but today pt reports as per above in HPI.    Exam:  /77   Pulse 64   Temp 99.5  F (37.5  C)   Resp 14   Wt 66.7 kg (147 lb)   SpO2 93%   BMI 26.04 kg/m      Tmax 99.5, -160s/70-80s, HR 60s, O2 sat > 90% RA, Weight down from 156 to 147#  Alert, somewhat witty at times  No scleral icterus  Mouth seems fairly moist  Heart irregularly irregular rate controlled with 3/6 systolic murmur, trace edema  Lungs with faint expiratory wheezes, no cough, crackles nor respiratory distress while lying back in reclining chair  Abdomen soft  Mood slightly down  Has insight into situation and discusses her aging body  Soft spoken, no tremor    Lab/Diagnostic data:  Labs done in SNF are in [place] EPIC. Please refer to them using [employer]Care Everywhere.  Last Comprehensive Metabolic Panel:  Sodium   Date Value Ref Range Status   04/28/2019 133 133 - 144 mmol/L Final     Potassium   Date Value Ref Range Status   04/28/2019 4.6 3.4 - 5.3 mmol/L Final     Chloride   Date Value Ref Range Status   04/28/2019 101 94 - 109 mmol/L Final     Carbon Dioxide   Date Value Ref Range Status   04/28/2019 21 20 - 32 mmol/L Final     Anion Gap   Date Value Ref Range Status   04/28/2019 11 3 - 14 mmol/L Final     Glucose   Date Value Ref Range Status   04/28/2019 194 (H) 70 - 99 mg/dL Final     Urea Nitrogen   Date Value Ref Range Status   04/28/2019 65 (H) 7 - 30 mg/dL Final     Creatinine   Date Value Ref Range Status   04/28/2019 1.76  "(H) 0.52 - 1.04 mg/dL Final     GFR Estimate   Date Value Ref Range Status   04/28/2019 24 (L) >60 mL/min/[1.73_m2] Final     Comment:     Non  GFR Calc  Starting 12/18/2018, serum creatinine based estimated GFR (eGFR) will be   calculated using the Chronic Kidney Disease Epidemiology Collaboration   (CKD-EPI) equation.       Calcium   Date Value Ref Range Status   04/28/2019 9.2 8.5 - 10.1 mg/dL Final     Lab Results   Component Value Date    WBC 12.6 04/27/2019     Lab Results   Component Value Date    RBC 3.57 04/27/2019     Lab Results   Component Value Date    HGB 10.9 04/27/2019     Lab Results   Component Value Date    HCT 31.7 04/27/2019     Lab Results   Component Value Date    MCV 89 04/27/2019     Lab Results   Component Value Date    MCH 30.5 04/27/2019     Lab Results   Component Value Date    MCHC 34.4 04/27/2019     Lab Results   Component Value Date    RDW 14.5 04/27/2019     Lab Results   Component Value Date     04/27/2019       ASSESSMENT/PLAN:  Bilateral community acquired pneumonia with underlying asthma exacerbation  Finish Ceftin and prednisone taper; she already completed Azithromycin course  Continue inhaled Fluticasone and PRN albuterol  Speech therapy eval for possible aspiration  Outpatient follow up CXR with PCP if indicated based on goals of care though suspect its not necessary as she wants to focus on comfort    Cognitive impairment  Cognitive testing to be completed in house  Per notes, \"Per daughter in law, patient frequently speaks [place]ian and tends to be confused when she is tired or ill.\"  We were able to have nice conversation today    Atrial fibrillation with h/o chronic combined systolic and diastolic heart failure and CKD3  Monitor volume status closely though concerned with BUN/Cr ratio that she is actually dry  Advised her that she keep up oral intake  Follow labs weekly or PRN  Continue on home Cardizem  PTA Lasix was dosed MWF but is currently held " per hospital discharge which is reasonable   Rivaroxaban changed to apixaban due to contraindication based on her GFR (this was discussed with pharmacy during hospital stay)    Electronically signed by:  Tara Wilder DO        Sincerely,        Tara Wilder DO

## 2019-05-01 ENCOUNTER — DOCUMENTATION ONLY (OUTPATIENT)
Dept: OTHER | Facility: CLINIC | Age: 84
End: 2019-05-01

## 2019-05-01 ENCOUNTER — HOSPITAL LABORATORY (OUTPATIENT)
Dept: OTHER | Facility: CLINIC | Age: 84
End: 2019-05-01

## 2019-05-01 LAB
ANION GAP SERPL CALCULATED.3IONS-SCNC: 8 MMOL/L (ref 3–14)
BUN SERPL-MCNC: 52 MG/DL (ref 7–30)
CALCIUM SERPL-MCNC: 9.3 MG/DL (ref 8.5–10.1)
CHLORIDE SERPL-SCNC: 104 MMOL/L (ref 94–109)
CO2 SERPL-SCNC: 25 MMOL/L (ref 20–32)
CREAT SERPL-MCNC: 1.38 MG/DL (ref 0.52–1.04)
ERYTHROCYTE [DISTWIDTH] IN BLOOD BY AUTOMATED COUNT: 15.1 % (ref 10–15)
GFR SERPL CREATININE-BSD FRML MDRD: 33 ML/MIN/{1.73_M2}
GLUCOSE SERPL-MCNC: 90 MG/DL (ref 70–99)
HCT VFR BLD AUTO: 43.6 % (ref 35–47)
HGB BLD-MCNC: 14.3 G/DL (ref 11.7–15.7)
INTERPRETATION ECG - MUSE: NORMAL
MCH RBC QN AUTO: 29.6 PG (ref 26.5–33)
MCHC RBC AUTO-ENTMCNC: 32.8 G/DL (ref 31.5–36.5)
MCV RBC AUTO: 90 FL (ref 78–100)
PLATELET # BLD AUTO: 291 10E9/L (ref 150–450)
POTASSIUM SERPL-SCNC: 4.7 MMOL/L (ref 3.4–5.3)
RBC # BLD AUTO: 4.83 10E12/L (ref 3.8–5.2)
SODIUM SERPL-SCNC: 137 MMOL/L (ref 133–144)
WBC # BLD AUTO: 15.2 10E9/L (ref 4–11)

## 2019-05-03 ENCOUNTER — NURSING HOME VISIT (OUTPATIENT)
Dept: GERIATRICS | Facility: CLINIC | Age: 84
End: 2019-05-03
Payer: MEDICARE

## 2019-05-03 VITALS
SYSTOLIC BLOOD PRESSURE: 184 MMHG | RESPIRATION RATE: 18 BRPM | WEIGHT: 155.4 LBS | OXYGEN SATURATION: 94 % | BODY MASS INDEX: 27.54 KG/M2 | HEART RATE: 64 BPM | HEIGHT: 63 IN | TEMPERATURE: 99.1 F | DIASTOLIC BLOOD PRESSURE: 82 MMHG

## 2019-05-03 DIAGNOSIS — R53.81 PHYSICAL DECONDITIONING: ICD-10-CM

## 2019-05-03 DIAGNOSIS — Z95.0 PACEMAKER: ICD-10-CM

## 2019-05-03 DIAGNOSIS — I48.20 CHRONIC ATRIAL FIBRILLATION (H): ICD-10-CM

## 2019-05-03 DIAGNOSIS — F03.90 DEMENTIA WITHOUT BEHAVIORAL DISTURBANCE, UNSPECIFIED DEMENTIA TYPE: ICD-10-CM

## 2019-05-03 DIAGNOSIS — N18.30 CKD (CHRONIC KIDNEY DISEASE) STAGE 3, GFR 30-59 ML/MIN (H): ICD-10-CM

## 2019-05-03 DIAGNOSIS — J45.909 UNCOMPLICATED ASTHMA, UNSPECIFIED ASTHMA SEVERITY, UNSPECIFIED WHETHER PERSISTENT: ICD-10-CM

## 2019-05-03 DIAGNOSIS — J18.9 COMMUNITY ACQUIRED PNEUMONIA, UNSPECIFIED LATERALITY: Primary | ICD-10-CM

## 2019-05-03 DIAGNOSIS — I50.42 CHRONIC COMBINED SYSTOLIC AND DIASTOLIC HEART FAILURE (H): ICD-10-CM

## 2019-05-03 DIAGNOSIS — I10 ESSENTIAL HYPERTENSION: ICD-10-CM

## 2019-05-03 DIAGNOSIS — R13.10 DYSPHAGIA, UNSPECIFIED TYPE: ICD-10-CM

## 2019-05-03 PROCEDURE — 99309 SBSQ NF CARE MODERATE MDM 30: CPT | Performed by: NURSE PRACTITIONER

## 2019-05-03 ASSESSMENT — MIFFLIN-ST. JEOR: SCORE: 1074.02

## 2019-05-03 NOTE — PROGRESS NOTES
[place] [facility]  [place] Medical Record Number:  [id number]  Place of Service where encounter took place:  [facility] [[id number]]  Chief Complaint   Patient presents with     RECHECK       HPI:    Swati Solis  is a 89 year old (12/10/1924), who is being seen today for an episodic care visit.  HPI information obtained from: facility chart records, facility staff, patient report, [facility] chart review and family/first contact granddaughter report.   She came to this facility 4/28/2019 for short term rehab and medical management following hospitalization with shortness of breath and hypoxia.   CXR showed left basilar atelectasis vs consolidation and opacity mid to upper right lung. She was treated with nebs, IV steroids, ceftriaxone and azithromycin with improvement. Discharged on prednisone taper and Ceftin for 5 days. Weaned off O2 prior to hospital discharge. She noted chest heaviness at rest. EKG with paced rhythm. ECHO: EF 50-55%, mid anteroseptal hypokinesis. Further work up was declined by family. Rivaroxaban was changed to apixaban and lisinopril was discontinued due to her CKD.     Today's concern is:     Community acquired pneumonia, unspecified laterality  Uncomplicated asthma, unspecified asthma severity, unspecified whether persistent-reports fatigue, but feels her breathing has improved. Afebrile. Completes ceftin today.   Dysphagia, unspecified type-nurse reported an episode of coughing and choking with water yesterday.Fluids were changed to nectar thick.  Has also had some difficulty with pills. Speech therapist is involved   Chronic combined systolic and diastolic heart failure (H)-weight stable at 155-156 lbs.   Chronic atrial fibrillation (H)-HR: 63-65  Pacemaker  CKD (chronic kidney disease) stage 3, GFR 30-59 ml/min (H)  Essential hypertension-BPs: 164/79, 167/95, 125/77  Dementia without behavioral disturbance, unspecified dementia type-more alert and  "talkative today. Speech is sometimes difficult to understand. SLUMS 5/30.   Physical deconditioning-ambulating 110 ft with walker and stand by assist. Requires max assist with cares. Granddaughter feels she's making some progress.     Past Medical and Surgical History reviewed in Epic today.    MEDICATIONS:  Current Outpatient Medications   Medication Sig Dispense Refill     acetaminophen (TYLENOL) 325 MG tablet Take 650 mg by mouth every 4 hours as needed for mild pain       albuterol (PROAIR HFA/PROVENTIL HFA/VENTOLIN HFA) 108 (90 Base) MCG/ACT inhaler Inhale 2 puffs into the lungs every 2 hours as needed for shortness of breath / dyspnea or wheezing       allopurinol (ZYLOPRIM) 100 MG tablet Take 100 mg by mouth daily 0730       apixaban ANTICOAGULANT (ELIQUIS) 2.5 MG tablet Take 1 tablet (2.5 mg) by mouth 2 times daily 60 tablet 1     diltiazem ER (DILT-XR) 120 MG 24 hr capsule Take 120 mg by mouth daily 0800       escitalopram (LEXAPRO) 5 MG tablet Take 5 mg by mouth daily 2000       fluticasone (FLOVENT DISKUS) 50 MCG/BLIST inhaler Inhale 1 puff into the lungs every 12 hours 0800, 2000       latanoprost (XALATAN) 0.005 % ophthalmic solution Place 1 drop into both eyes At Bedtime 2000       lovastatin (MEVACOR) 20 MG tablet Take 20 mg by mouth every evening 1500       predniSONE (DELTASONE) 10 MG tablet Take 10 mg by mouth daily.       [START ON 5/6/2019] predniSONE (DELTASONE) 5 MG tablet Take 5 mg by mouth daily.       senna-docusate (SENOKOT-S/PERICOLACE) 8.6-50 MG tablet Take 1 tablet by mouth 2 times daily as needed for constipation           REVIEW OF SYSTEMS:  4 point ROS including Respiratory, CV, GI and , other than that noted in the HPI,  is negative    Objective:  /82   Pulse 64   Temp 99.1  F (37.3  C)   Resp 18   Ht 1.6 m (5' 3\")   Wt 70.5 kg (155 lb 6.4 oz)   SpO2 94%   BMI 27.53 kg/m    Exam:  GENERAL APPEARANCE:  Alert, in no distress  ENT:  Mouth and posterior oropharynx normal, " moist mucous membranes, [place]  EYES:  EOM normal, conjunctiva and lids normal, PERRL  NECK:  No adenopathy,masses or thyromegaly  RESP:  respiratory effort and palpation of chest normal, no respiratory distress,occasional expiratory wheeze  CV:  Palpation and auscultation of heart done , regular rate and rhythm, no murmur, no edema  ABDOMEN:  soft, nontender, no hepatosplenomegaly or other masses  M/S:   in bed. ALONSO with good strength. No joint inflammation  SKIN:  no rashes or open areas  PSYCH:  oriented to self, family, situation, insight and judgement impaired, memory impaired , affect and mood normal     Labs:   Lab Results   Component Value Date    WBC 15.2 05/01/2019     Lab Results   Component Value Date    RBC 4.83 05/01/2019     Lab Results   Component Value Date    HGB 14.3 05/01/2019     Lab Results   Component Value Date    HCT 43.6 05/01/2019     Lab Results   Component Value Date    MCV 90 05/01/2019     Lab Results   Component Value Date    MCH 29.6 05/01/2019     Lab Results   Component Value Date    MCHC 32.8 05/01/2019     Lab Results   Component Value Date    RDW 15.1 05/01/2019     Lab Results   Component Value Date     05/01/2019     Last Comprehensive Metabolic Panel:  Sodium   Date Value Ref Range Status   05/01/2019 137 133 - 144 mmol/L Final     Potassium   Date Value Ref Range Status   05/01/2019 4.7 3.4 - 5.3 mmol/L Final     Chloride   Date Value Ref Range Status   05/01/2019 104 94 - 109 mmol/L Final     Carbon Dioxide   Date Value Ref Range Status   05/01/2019 25 20 - 32 mmol/L Final     Anion Gap   Date Value Ref Range Status   05/01/2019 8 3 - 14 mmol/L Final     Glucose   Date Value Ref Range Status   05/01/2019 90 70 - 99 mg/dL Final     Urea Nitrogen   Date Value Ref Range Status   05/01/2019 52 (H) 7 - 30 mg/dL Final     Creatinine   Date Value Ref Range Status   05/01/2019 1.38 (H) 0.52 - 1.04 mg/dL Final     GFR Estimate   Date Value Ref Range Status   05/01/2019 33 (L)  >60 mL/min/[1.73_m2] Final     Comment:     Non  GFR Calc  Starting 12/18/2018, serum creatinine based estimated GFR (eGFR) will be   calculated using the Chronic Kidney Disease Epidemiology Collaboration   (CKD-EPI) equation.       Calcium   Date Value Ref Range Status   05/01/2019 9.3 8.5 - 10.1 mg/dL Final       ASSESSMENT / PLAN:  (J18.9) Community acquired pneumonia, unspecified laterality  (primary encounter diagnosis)  (J45.909) Uncomplicated asthma, unspecified asthma severity, unspecified whether persistent  Comment: respiratory status improving.   Plan: complete ceftin today. Continue prednisone taper, fluticasone, prn albuterol.     (R13.10) Dysphagia, unspecified type  Comment: ?if pneumonia was related to aspiration  Plan: continue nectar thick liquids pending further recommendations from SPEECH THERAPY.     (I50.42) Chronic combined systolic and diastolic heart failure (H)  Comment: compensated, not on diuretic  Plan: daily weight.     (I48.2) Chronic atrial fibrillation (H)  (Z95.0) Pacemaker  Comment: rate controlled  Plan: continue diltiazem, apixaban.     (N18.3) CKD (chronic kidney disease) stage 3, GFR 30-59 ml/min (H)  Comment: renal function improved  Plan: follow BMP. Avoid nephrotoxins     (I10) Essential hypertension  Comment: acceptable control.   Plan: continue diltiazem. Monitor VS    (F03.90) Dementia without behavioral disturbance, unspecified dementia type  Comment: she scored lower on SLUMS than expected. Appears to have moderate deficits with conversation  Plan: CPT per therapies. Supportive care    (R53.81) Physical deconditioning  Comment: slow progress in therapies  Plan: continue PHYSICAL THERAPY/OT.  Goal is to return to [facility]      Electronically signed by:  JAKE Fraire CNP

## 2019-05-03 NOTE — LETTER
5/3/2019        RE: Swati Solis  [address]  [place] [address]        [place] [facility]  [place] Medical Record Number:  [id number]  [facility] of Service where encounter took place[facility] [[id number]]  Chief Complaint   Patient presents with     RECHECK       HPI:    Swati Solis  is a 89 year old (12/10/1924), who is being seen today for an episodic care visit.  HPI information obtained from: facility chart records, facility staff, patient report, [facility] chart review and family/first contact granddaughter report.   She came to this facility 4/28/2019 for short term rehab and medical management following hospitalization with shortness of breath and hypoxia.   CXR showed left basilar atelectasis vs consolidation and opacity mid to upper right lung. She was treated with nebs, IV steroids, ceftriaxone and azithromycin with improvement. Discharged on prednisone taper and Ceftin for 5 days. Weaned off O2 prior to hospital discharge. She noted chest heaviness at rest. EKG with paced rhythm. ECHO: EF 50-55%, mid anteroseptal hypokinesis. Further work up was declined by family. Rivaroxaban was changed to apixaban and lisinopril was discontinued due to her CKD.     Today's concern is:     Community acquired pneumonia, unspecified laterality  Uncomplicated asthma, unspecified asthma severity, unspecified whether persistent-reports fatigue, but feels her breathing has improved. Afebrile. Completes ceftin today.   Dysphagia, unspecified type-nurse reported an episode of coughing and choking with water yesterday.Fluids were changed to nectar thick.  Has also had some difficulty with pills. Speech therapist is involved   Chronic combined systolic and diastolic heart failure (H)-weight stable at 155-156 lbs.   Chronic atrial fibrillation (H)-HR: 63-65  Pacemaker  CKD (chronic kidney disease) stage 3, GFR 30-59 ml/min (H)  Essential hypertension-BPs: 164/79, 167/95,  "125/77  Dementia without behavioral disturbance, unspecified dementia type-more alert and talkative today. Speech is sometimes difficult to understand. SLUMS 5/30.   Physical deconditioning-ambulating 110 ft with walker and stand by assist. Requires max assist with cares. Granddaughter feels she's making some progress.     Past Medical and Surgical History reviewed in Epic today.    MEDICATIONS:  Current Outpatient Medications   Medication Sig Dispense Refill     acetaminophen (TYLENOL) 325 MG tablet Take 650 mg by mouth every 4 hours as needed for mild pain       albuterol (PROAIR HFA/PROVENTIL HFA/VENTOLIN HFA) 108 (90 Base) MCG/ACT inhaler Inhale 2 puffs into the lungs every 2 hours as needed for shortness of breath / dyspnea or wheezing       allopurinol (ZYLOPRIM) 100 MG tablet Take 100 mg by mouth daily 0730       apixaban ANTICOAGULANT (ELIQUIS) 2.5 MG tablet Take 1 tablet (2.5 mg) by mouth 2 times daily 60 tablet 1     diltiazem ER (DILT-XR) 120 MG 24 hr capsule Take 120 mg by mouth daily 0800       escitalopram (LEXAPRO) 5 MG tablet Take 5 mg by mouth daily 2000       fluticasone (FLOVENT DISKUS) 50 MCG/BLIST inhaler Inhale 1 puff into the lungs every 12 hours 0800, 2000       latanoprost (XALATAN) 0.005 % ophthalmic solution Place 1 drop into both eyes At Bedtime 2000       lovastatin (MEVACOR) 20 MG tablet Take 20 mg by mouth every evening 1500       predniSONE (DELTASONE) 10 MG tablet Take 10 mg by mouth daily.       [START ON 5/6/2019] predniSONE (DELTASONE) 5 MG tablet Take 5 mg by mouth daily.       senna-docusate (SENOKOT-S/PERICOLACE) 8.6-50 MG tablet Take 1 tablet by mouth 2 times daily as needed for constipation           REVIEW OF SYSTEMS:  4 point ROS including Respiratory, CV, GI and , other than that noted in the HPI,  is negative    Objective:  /82   Pulse 64   Temp 99.1  F (37.3  C)   Resp 18   Ht 1.6 m (5' 3\")   Wt 70.5 kg (155 lb 6.4 oz)   SpO2 94%   BMI 27.53 kg/m   "   Exam:  GENERAL APPEARANCE:  Alert, in no distress  ENT:  Mouth and posterior oropharynx normal, moist mucous membranes, [place]  EYES:  EOM normal, conjunctiva and lids normal, PERRL  NECK:  No adenopathy,masses or thyromegaly  RESP:  respiratory effort and palpation of chest normal, no respiratory distress,occasional expiratory wheeze  CV:  Palpation and auscultation of heart done , regular rate and rhythm, no murmur, no edema  ABDOMEN:  soft, nontender, no hepatosplenomegaly or other masses  M/S:   in bed. ALONSO with good strength. No joint inflammation  SKIN:  no rashes or open areas  PSYCH:  oriented to self,  family, situation, insight and judgement impaired, memory impaired , affect and mood normal     Labs:   Lab Results   Component Value Date    WBC 15.2 05/01/2019     Lab Results   Component Value Date    RBC 4.83 05/01/2019     Lab Results   Component Value Date    HGB 14.3 05/01/2019     Lab Results   Component Value Date    HCT 43.6 05/01/2019     Lab Results   Component Value Date    MCV 90 05/01/2019     Lab Results   Component Value Date    MCH 29.6 05/01/2019     Lab Results   Component Value Date    MCHC 32.8 05/01/2019     Lab Results   Component Value Date    RDW 15.1 05/01/2019     Lab Results   Component Value Date     05/01/2019     Last Comprehensive Metabolic Panel:  Sodium   Date Value Ref Range Status   05/01/2019 137 133 - 144 mmol/L Final     Potassium   Date Value Ref Range Status   05/01/2019 4.7 3.4 - 5.3 mmol/L Final     Chloride   Date Value Ref Range Status   05/01/2019 104 94 - 109 mmol/L Final     Carbon Dioxide   Date Value Ref Range Status   05/01/2019 25 20 - 32 mmol/L Final     Anion Gap   Date Value Ref Range Status   05/01/2019 8 3 - 14 mmol/L Final     Glucose   Date Value Ref Range Status   05/01/2019 90 70 - 99 mg/dL Final     Urea Nitrogen   Date Value Ref Range Status   05/01/2019 52 (H) 7 - 30 mg/dL Final     Creatinine   Date Value Ref Range Status   05/01/2019  1.38 (H) 0.52 - 1.04 mg/dL Final     GFR Estimate   Date Value Ref Range Status   05/01/2019 33 (L) >60 mL/min/[1.73_m2] Final     Comment:     Non  GFR Calc  Starting 12/18/2018, serum creatinine based estimated GFR (eGFR) will be   calculated using the Chronic Kidney Disease Epidemiology Collaboration   (CKD-EPI) equation.       Calcium   Date Value Ref Range Status   05/01/2019 9.3 8.5 - 10.1 mg/dL Final       ASSESSMENT / PLAN:  (J18.9) Community acquired pneumonia, unspecified laterality  (primary encounter diagnosis)  (J45.909) Uncomplicated asthma, unspecified asthma severity, unspecified whether persistent  Comment: respiratory status improving.   Plan: complete ceftin today. Continue prednisone taper, fluticasone, prn albuterol.     (R13.10) Dysphagia, unspecified type  Comment: ?if pneumonia was related to aspiration  Plan: continue nectar thick liquids pending further recommendations from SPEECH THERAPY.     (I50.42) Chronic combined systolic and diastolic heart failure (H)  Comment: compensated, not on diuretic  Plan: daily weight.     (I48.2) Chronic atrial fibrillation (H)  (Z95.0) Pacemaker  Comment: rate controlled  Plan: continue diltiazem, apixaban.     (N18.3) CKD (chronic kidney disease) stage 3, GFR 30-59 ml/min (H)  Comment: renal function improved  Plan: follow BMP. Avoid nephrotoxins     (I10) Essential hypertension  Comment: acceptable control.   Plan: continue diltiazem. Monitor VS    (F03.90) Dementia without behavioral disturbance, unspecified dementia type  Comment: she scored lower on SLUMS than expected. Appears to have moderate deficits with conversation  Plan: CPT per therapies. Supportive care    (R53.81) Physical deconditioning  Comment: slow progress in therapies  Plan: continue PHYSICAL THERAPY/OT.  Goal is to return to [facility]      Electronically signed by:  JAKE Fraire CNP       Sincerely,        JAKE Fraire CNP

## 2019-05-08 ENCOUNTER — HOSPITAL LABORATORY (OUTPATIENT)
Dept: OTHER | Facility: CLINIC | Age: 84
End: 2019-05-08

## 2019-05-08 ENCOUNTER — NURSING HOME VISIT (OUTPATIENT)
Dept: GERIATRICS | Facility: CLINIC | Age: 84
End: 2019-05-08
Payer: MEDICARE

## 2019-05-08 VITALS
WEIGHT: 149.6 LBS | DIASTOLIC BLOOD PRESSURE: 70 MMHG | RESPIRATION RATE: 16 BRPM | OXYGEN SATURATION: 94 % | BODY MASS INDEX: 26.51 KG/M2 | HEART RATE: 89 BPM | SYSTOLIC BLOOD PRESSURE: 169 MMHG | HEIGHT: 63 IN | TEMPERATURE: 97.9 F

## 2019-05-08 DIAGNOSIS — I10 ESSENTIAL HYPERTENSION: ICD-10-CM

## 2019-05-08 DIAGNOSIS — I48.20 CHRONIC ATRIAL FIBRILLATION (H): ICD-10-CM

## 2019-05-08 DIAGNOSIS — R53.81 PHYSICAL DECONDITIONING: ICD-10-CM

## 2019-05-08 DIAGNOSIS — J18.9 COMMUNITY ACQUIRED PNEUMONIA, UNSPECIFIED LATERALITY: Primary | ICD-10-CM

## 2019-05-08 DIAGNOSIS — Z95.0 PACEMAKER: ICD-10-CM

## 2019-05-08 DIAGNOSIS — N18.30 CKD (CHRONIC KIDNEY DISEASE) STAGE 3, GFR 30-59 ML/MIN (H): ICD-10-CM

## 2019-05-08 DIAGNOSIS — I50.42 CHRONIC COMBINED SYSTOLIC AND DIASTOLIC HEART FAILURE (H): ICD-10-CM

## 2019-05-08 DIAGNOSIS — R13.10 DYSPHAGIA, UNSPECIFIED TYPE: ICD-10-CM

## 2019-05-08 DIAGNOSIS — J45.909 UNCOMPLICATED ASTHMA, UNSPECIFIED ASTHMA SEVERITY, UNSPECIFIED WHETHER PERSISTENT: ICD-10-CM

## 2019-05-08 DIAGNOSIS — F03.90 DEMENTIA WITHOUT BEHAVIORAL DISTURBANCE, UNSPECIFIED DEMENTIA TYPE: ICD-10-CM

## 2019-05-08 LAB
ANION GAP SERPL CALCULATED.3IONS-SCNC: 6 MMOL/L (ref 3–14)
BUN SERPL-MCNC: 37 MG/DL (ref 7–30)
CALCIUM SERPL-MCNC: 8.6 MG/DL (ref 8.5–10.1)
CHLORIDE SERPL-SCNC: 108 MMOL/L (ref 94–109)
CO2 SERPL-SCNC: 26 MMOL/L (ref 20–32)
CREAT SERPL-MCNC: 1.18 MG/DL (ref 0.52–1.04)
ERYTHROCYTE [DISTWIDTH] IN BLOOD BY AUTOMATED COUNT: 16.5 % (ref 10–15)
GFR SERPL CREATININE-BSD FRML MDRD: 39 ML/MIN/{1.73_M2}
GLUCOSE SERPL-MCNC: 74 MG/DL (ref 70–99)
HCT VFR BLD AUTO: 35.9 % (ref 35–47)
HGB BLD-MCNC: 11.6 G/DL (ref 11.7–15.7)
MCH RBC QN AUTO: 29.7 PG (ref 26.5–33)
MCHC RBC AUTO-ENTMCNC: 32.3 G/DL (ref 31.5–36.5)
MCV RBC AUTO: 92 FL (ref 78–100)
PLATELET # BLD AUTO: 192 10E9/L (ref 150–450)
POTASSIUM SERPL-SCNC: 3.9 MMOL/L (ref 3.4–5.3)
RBC # BLD AUTO: 3.9 10E12/L (ref 3.8–5.2)
SODIUM SERPL-SCNC: 140 MMOL/L (ref 133–144)
WBC # BLD AUTO: 13.6 10E9/L (ref 4–11)

## 2019-05-08 PROCEDURE — 99309 SBSQ NF CARE MODERATE MDM 30: CPT | Performed by: NURSE PRACTITIONER

## 2019-05-08 ASSESSMENT — MIFFLIN-ST. JEOR: SCORE: 1047.71

## 2019-05-08 NOTE — PROGRESS NOTES
[facility]  [facility] Record Number:  [id number]  [facility] of Service where encounter took place:  [facility] [[id number]]  Chief Complaint   Patient presents with     RECHECK       HPI:    Swati Solis  is a 89 year old (12/10/1924), who is being seen today for an episodic care visit.  HPI information obtained from: facility chart records, facility staff, patient report, [facility] chart review and family/first contact granddaughter Ann Solis report.   She came to this facility 4/28/2019 for short term rehab and medical management following hospitalization with shortness of breath and hypoxia.   CXR showed left basilar atelectasis vs consolidation and opacity mid to upper right lung. She was treated with nebs, IV steroids, ceftriaxone and azithromycin with improvement. Discharged on prednisone taper and Ceftin for 5 days. Weaned off O2 prior to hospital discharge. She noted chest heaviness at rest. EKG with paced rhythm. ECHO: EF 50-55%, mid anteroseptal hypokinesis. Further work up was declined by family. Rivaroxaban was changed to apixaban and lisinopril was discontinued due to her CKD.     Today's concern is:     Community acquired pneumonia, unspecified laterality-afebrile. Completed Ceftin 5/3/2019 and prednisone taper today. No hypoxia. She has mild wheezing yesterday, relieved with albuterol inhaler.   Uncomplicated asthma, unspecified asthma severity, unspecified whether persistent  Dysphagia, unspecified type-speech therapist downgraded liquids to nectar thick after an episode of choking and coughing several days ago. No further episodes noted. Sometimes declines getting out of bed for meals.   Chronic combined systolic and diastolic heart failure (H)-weight down 2 lbs to 149 lbs.   Chronic atrial fibrillation (H)-HR: 62-69  Pacemaker  CKD (chronic kidney disease) stage 3, GFR 30-59 ml/min (H)  Essential hypertension-BPs: 190/82, 159/72, 156/80, 140/79   "  Dementia without behavioral disturbance, unspecified dementia type-pleasant and talkative. Speech is difficult to understand at times and she does not respond to all questions appropriately. SLUMS 5/30, CPT 4.3/5.6  Physical deconditioning-ambulating 300 ft with walker and stand by assist. Requires min assist with cares.     Past Medical and Surgical History reviewed in Epic today.    MEDICATIONS:  Current Outpatient Medications   Medication Sig Dispense Refill     acetaminophen (TYLENOL) 325 MG tablet Take 650 mg by mouth every 4 hours as needed for mild pain       albuterol (PROAIR HFA/PROVENTIL HFA/VENTOLIN HFA) 108 (90 Base) MCG/ACT inhaler Inhale 2 puffs into the lungs every 2 hours as needed for shortness of breath / dyspnea or wheezing       allopurinol (ZYLOPRIM) 100 MG tablet Take 100 mg by mouth daily 0730       apixaban ANTICOAGULANT (ELIQUIS) 2.5 MG tablet Take 1 tablet (2.5 mg) by mouth 2 times daily 60 tablet 1     diltiazem ER (DILT-XR) 120 MG 24 hr capsule Take 120 mg by mouth daily 0800       escitalopram (LEXAPRO) 5 MG tablet Take 5 mg by mouth daily 2000       fluticasone (FLOVENT DISKUS) 50 MCG/BLIST inhaler Inhale 1 puff into the lungs every 12 hours 0800, 2000       latanoprost (XALATAN) 0.005 % ophthalmic solution Place 1 drop into both eyes At Bedtime 2000       lovastatin (MEVACOR) 20 MG tablet Take 20 mg by mouth every evening 1500       predniSONE (DELTASONE) 5 MG tablet Take 5 mg by mouth daily.       senna-docusate (SENOKOT-S/PERICOLACE) 8.6-50 MG tablet Take 1 tablet by mouth 2 times daily as needed for constipation       predniSONE (DELTASONE) 10 MG tablet Take 10 mg by mouth daily.         REVIEW OF SYSTEMS:  4 point ROS including Respiratory, CV, GI and , other than that noted in the HPI,  is negative    Objective:  /70   Pulse 89   Temp 97.9  F (36.6  C)   Resp 16   Ht 1.6 m (5' 3\")   Wt 67.9 kg (149 lb 9.6 oz)   SpO2 94%   BMI 26.50 kg/m    Exam:  GENERAL " APPEARANCE:  Alert, in no distress  ENT:  Mouth and posterior oropharynx normal, moist mucous membranes, [place]  EYES:  EOM normal, conjunctiva and lids normal  NECK:  No adenopathy,masses or thyromegaly  RESP:  respiratory effort and palpation of chest normal, no respiratory distress,occasional expiratory wheeze  CV:  Palpation and auscultation of heart done , regular rate and rhythm, no murmur, no edema  ABDOMEN:  soft, nontender, no hepatosplenomegaly or other masses  M/S:   in bed. ALONSO with good strength. No joint inflammation  SKIN:  no rashes or open areas  PSYCH:  oriented to self, family, situation, insight and judgement impaired, memory impaired , affect and mood normal     Labs:   Lab Results   Component Value Date    WBC 13.6 05/08/2019     Lab Results   Component Value Date    RBC 3.90 05/08/2019     Lab Results   Component Value Date    HGB 11.6 05/08/2019     Lab Results   Component Value Date    HCT 35.9 05/08/2019     Lab Results   Component Value Date    MCV 92 05/08/2019     Lab Results   Component Value Date    MCH 29.7 05/08/2019     Lab Results   Component Value Date    MCHC 32.3 05/08/2019     Lab Results   Component Value Date    RDW 16.5 05/08/2019     Lab Results   Component Value Date     05/08/2019     Last Comprehensive Metabolic Panel:  Sodium   Date Value Ref Range Status   05/08/2019 140 133 - 144 mmol/L Final     Potassium   Date Value Ref Range Status   05/08/2019 3.9 3.4 - 5.3 mmol/L Final     Chloride   Date Value Ref Range Status   05/08/2019 108 94 - 109 mmol/L Final     Carbon Dioxide   Date Value Ref Range Status   05/08/2019 26 20 - 32 mmol/L Final     Anion Gap   Date Value Ref Range Status   05/08/2019 6 3 - 14 mmol/L Final     Glucose   Date Value Ref Range Status   05/08/2019 74 70 - 99 mg/dL Final     Urea Nitrogen   Date Value Ref Range Status   05/08/2019 37 (H) 7 - 30 mg/dL Final     Creatinine   Date Value Ref Range Status   05/08/2019 1.18 (H) 0.52 - 1.04 mg/dL  Final     GFR Estimate   Date Value Ref Range Status   05/08/2019 39 (L) >60 mL/min/[1.73_m2] Final     Comment:     Non  GFR Calc  Starting 12/18/2018, serum creatinine based estimated GFR (eGFR) will be   calculated using the Chronic Kidney Disease Epidemiology Collaboration   (CKD-EPI) equation.       Calcium   Date Value Ref Range Status   05/08/2019 8.6 8.5 - 10.1 mg/dL Final       ASSESSMENT / PLAN:  (J18.9) Community acquired pneumonia, unspecified laterality  (primary encounter diagnosis)  (J45.909) Uncomplicated asthma, unspecified asthma severity, unspecified whether persistent  Comment: respiratory status has improved. Completed Ceftin.   Plan: last dose prednisone today. Continue prn albuterol and fluticasone.     (R13.10) Dysphagia, unspecified type  Comment: likely related to advancing dementia and overall deconditioning. Tolerating regular with nectar thick liquids.   Plan: continue SPEECH THERAPY, advanced diet as tolerated. She needs to be out of bed for meals. Signs/symptoms of aspiration discussed with granddaughter.     (I50.42) Chronic combined systolic and diastolic heart failure (H)  Comment: appears compensated. Lasix was discontinued while inpatient due to GABINO and acute illness  Plan: daily weight, follow symptoms     (I48.2) Chronic atrial fibrillation (H)  (Z95.0) Pacemaker  Comment: rate controlled.   Plan: continue diltiazem, apixaban.     (N18.3) CKD (chronic kidney disease) stage 3, GFR 30-59 ml/min (H)  Comment: renal function is improving.  Plan: follow BMP. Avoid nephrotoxins.     (I10) Essential hypertension  Comment: acceptable control, given her advanced age and fall risk  Plan: continue diltiazem. Monitor VS    (F03.90) Dementia without behavioral disturbance, unspecified dementia type  Comment: moderate to severe deficits  Plan: supportive care     (R53.81) Physical deconditioning  Comment: slowly progressing in therapies  Plan: continue PHYSICAL THERAPY/OT.  Goal is to return to [facility] with high level services.      Electronically signed by:  JAKE Fraire CNP

## 2019-05-08 NOTE — LETTER
5/8/2019        RE: Swati Solis  [address]  [place] [address]        [facility]  [facility] Record Number:  [id number]  [facility] where encounter took place[facility] [[id number]]  Chief Complaint   Patient presents with     RECHECK       HPI:    Swati Solis  is a 89 year old (12/10/1924), who is being seen today for an episodic care visit.  HPI information obtained from: facility chart records, facility staff, patient report, [facility] chart review and family/first contact granddaughter Ann Solis report.   She came to this facility 4/28/2019 for short term rehab and medical management following hospitalization with shortness of breath and hypoxia.   CXR showed left basilar atelectasis vs consolidation and opacity mid to upper right lung. She was treated with nebs, IV steroids, ceftriaxone and azithromycin with improvement. Discharged on prednisone taper and Ceftin for 5 days. Weaned off O2 prior to hospital discharge. She noted chest heaviness at rest. EKG with paced rhythm. ECHO: EF 50-55%, mid anteroseptal hypokinesis. Further work up was declined by family. Rivaroxaban was changed to apixaban and lisinopril was discontinued due to her CKD.     Today's concern is:     Community acquired pneumonia, unspecified laterality-afebrile. Completed Ceftin 5/3/2019 and prednisone taper today. No hypoxia. She has mild wheezing yesterday, relieved with albuterol inhaler.   Uncomplicated asthma, unspecified asthma severity, unspecified whether persistent  Dysphagia, unspecified type-speech therapist downgraded liquids to nectar thick after an episode of choking and coughing several days ago. No further episodes noted. Sometimes declines getting out of bed for meals.   Chronic combined systolic and diastolic heart failure (H)-weight down 2 lbs to 149 lbs.   Chronic atrial fibrillation (H)-HR: 62-69  Pacemaker  CKD (chronic kidney  disease) stage 3, GFR 30-59 ml/min (H)  Essential hypertension-BPs: 190/82, 159/72, 156/80, 140/79    Dementia without behavioral disturbance, unspecified dementia type-pleasant and talkative. Speech is difficult to understand at times and she does not respond to all questions appropriately. SLUMS 5/30, CPT 4.3/5.6  Physical deconditioning-ambulating 300 ft with walker and stand by assist. Requires min assist with cares.     Past Medical and Surgical History reviewed in Epic today.    MEDICATIONS:  Current Outpatient Medications   Medication Sig Dispense Refill     acetaminophen (TYLENOL) 325 MG tablet Take 650 mg by mouth every 4 hours as needed for mild pain       albuterol (PROAIR HFA/PROVENTIL HFA/VENTOLIN HFA) 108 (90 Base) MCG/ACT inhaler Inhale 2 puffs into the lungs every 2 hours as needed for shortness of breath / dyspnea or wheezing       allopurinol (ZYLOPRIM) 100 MG tablet Take 100 mg by mouth daily 0730       apixaban ANTICOAGULANT (ELIQUIS) 2.5 MG tablet Take 1 tablet (2.5 mg) by mouth 2 times daily 60 tablet 1     diltiazem ER (DILT-XR) 120 MG 24 hr capsule Take 120 mg by mouth daily 0800       escitalopram (LEXAPRO) 5 MG tablet Take 5 mg by mouth daily 2000       fluticasone (FLOVENT DISKUS) 50 MCG/BLIST inhaler Inhale 1 puff into the lungs every 12 hours 0800, 2000       latanoprost (XALATAN) 0.005 % ophthalmic solution Place 1 drop into both eyes At Bedtime 2000       lovastatin (MEVACOR) 20 MG tablet Take 20 mg by mouth every evening 1500       predniSONE (DELTASONE) 5 MG tablet Take 5 mg by mouth daily.       senna-docusate (SENOKOT-S/PERICOLACE) 8.6-50 MG tablet Take 1 tablet by mouth 2 times daily as needed for constipation       predniSONE (DELTASONE) 10 MG tablet Take 10 mg by mouth daily.         REVIEW OF SYSTEMS:  4 point ROS including Respiratory, CV, GI and , other than that noted in the HPI,  is negative    Objective:  /70   Pulse 89   Temp 97.9  F (36.6  C)   Resp 16   Ht  "1.6 m (5' 3\")   Wt 67.9 kg (149 lb 9.6 oz)   SpO2 94%   BMI 26.50 kg/m     Exam:  GENERAL APPEARANCE:  Alert, in no distress  ENT:  Mouth and posterior oropharynx normal, moist mucous membranes, [place]  EYES:  EOM normal, conjunctiva and lids normal  NECK:  No adenopathy,masses or thyromegaly  RESP:  respiratory effort and palpation of chest normal, no respiratory distress,occasional expiratory wheeze  CV:  Palpation and auscultation of heart done , regular rate and rhythm, no murmur, no edema  ABDOMEN:  soft, nontender, no hepatosplenomegaly or other masses  M/S:   in bed. ALONSO with good strength. No joint inflammation  SKIN:  no rashes or open areas  PSYCH:  oriented to self, family, situation, insight and judgement impaired, memory impaired , affect and mood normal     Labs:   Lab Results   Component Value Date    WBC 13.6 05/08/2019     Lab Results   Component Value Date    RBC 3.90 05/08/2019     Lab Results   Component Value Date    HGB 11.6 05/08/2019     Lab Results   Component Value Date    HCT 35.9 05/08/2019     Lab Results   Component Value Date    MCV 92 05/08/2019     Lab Results   Component Value Date    MCH 29.7 05/08/2019     Lab Results   Component Value Date    MCHC 32.3 05/08/2019     Lab Results   Component Value Date    RDW 16.5 05/08/2019     Lab Results   Component Value Date     05/08/2019     Last Comprehensive Metabolic Panel:  Sodium   Date Value Ref Range Status   05/08/2019 140 133 - 144 mmol/L Final     Potassium   Date Value Ref Range Status   05/08/2019 3.9 3.4 - 5.3 mmol/L Final     Chloride   Date Value Ref Range Status   05/08/2019 108 94 - 109 mmol/L Final     Carbon Dioxide   Date Value Ref Range Status   05/08/2019 26 20 - 32 mmol/L Final     Anion Gap   Date Value Ref Range Status   05/08/2019 6 3 - 14 mmol/L Final     Glucose   Date Value Ref Range Status   05/08/2019 74 70 - 99 mg/dL Final     Urea Nitrogen   Date Value Ref Range Status   05/08/2019 37 (H) 7 - 30 mg/dL " Final     Creatinine   Date Value Ref Range Status   05/08/2019 1.18 (H) 0.52 - 1.04 mg/dL Final     GFR Estimate   Date Value Ref Range Status   05/08/2019 39 (L) >60 mL/min/[1.73_m2] Final     Comment:     Non  GFR Calc  Starting 12/18/2018, serum creatinine based estimated GFR (eGFR) will be   calculated using the Chronic Kidney Disease Epidemiology Collaboration   (CKD-EPI) equation.       Calcium   Date Value Ref Range Status   05/08/2019 8.6 8.5 - 10.1 mg/dL Final       ASSESSMENT / PLAN:  (J18.9) Community acquired pneumonia, unspecified laterality  (primary encounter diagnosis)  (J45.909) Uncomplicated asthma, unspecified asthma severity, unspecified whether persistent  Comment: respiratory status has improved. Completed Ceftin.   Plan: last dose prednisone today. Continue prn albuterol and fluticasone.     (R13.10) Dysphagia, unspecified type  Comment: likely related to advancing dementia and overall deconditioning. Tolerating regular with nectar thick liquids.   Plan: continue SPEECH THERAPY, advanced diet as tolerated. She needs to be out of bed for meals. Signs/symptoms of aspiration discussed with granddaughter.     (I50.42) Chronic combined systolic and diastolic heart failure (H)  Comment: appears compensated. Lasix was discontinued while inpatient due to GABINO and acute illness  Plan: daily weight, follow symptoms     (I48.2) Chronic atrial fibrillation (H)  (Z95.0) Pacemaker  Comment: rate controlled.   Plan: continue diltiazem, apixaban.     (N18.3) CKD (chronic kidney disease) stage 3, GFR 30-59 ml/min (H)  Comment: renal function is improving.  Plan: follow BMP. Avoid nephrotoxins.     (I10) Essential hypertension  Comment: acceptable control, given her advanced age and fall risk  Plan: continue diltiazem. Monitor VS    (F03.90) Dementia without behavioral disturbance, unspecified dementia type  Comment: moderate to severe deficits  Plan: supportive care     (R53.81) Physical  deconditioning  Comment: slowly progressing in therapies  Plan: continue PHYSICAL THERAPY/OT. Goal is to return to [facility] with high level services.      Electronically signed by:  JAKE Fraire CNP           Sincerely,        JAKE Fraire CNP

## 2019-05-09 ENCOUNTER — NURSING HOME VISIT (OUTPATIENT)
Dept: GERIATRICS | Facility: CLINIC | Age: 84
End: 2019-05-09
Payer: MEDICARE

## 2019-05-09 VITALS
SYSTOLIC BLOOD PRESSURE: 169 MMHG | HEIGHT: 63 IN | TEMPERATURE: 97.9 F | BODY MASS INDEX: 26.51 KG/M2 | OXYGEN SATURATION: 94 % | WEIGHT: 149.6 LBS | RESPIRATION RATE: 16 BRPM | HEART RATE: 89 BPM | DIASTOLIC BLOOD PRESSURE: 70 MMHG

## 2019-05-09 DIAGNOSIS — N18.30 CKD (CHRONIC KIDNEY DISEASE) STAGE 3, GFR 30-59 ML/MIN (H): ICD-10-CM

## 2019-05-09 DIAGNOSIS — J69.0 ASPIRATION PNEUMONIA OF BOTH LUNGS, UNSPECIFIED ASPIRATION PNEUMONIA TYPE, UNSPECIFIED PART OF LUNG (H): Primary | ICD-10-CM

## 2019-05-09 DIAGNOSIS — F03.90 DEMENTIA WITHOUT BEHAVIORAL DISTURBANCE, UNSPECIFIED DEMENTIA TYPE: ICD-10-CM

## 2019-05-09 DIAGNOSIS — I10 ESSENTIAL HYPERTENSION: ICD-10-CM

## 2019-05-09 DIAGNOSIS — R53.81 PHYSICAL DECONDITIONING: ICD-10-CM

## 2019-05-09 DIAGNOSIS — I48.20 CHRONIC ATRIAL FIBRILLATION (H): ICD-10-CM

## 2019-05-09 DIAGNOSIS — Z95.0 PACEMAKER: ICD-10-CM

## 2019-05-09 DIAGNOSIS — I50.42 CHRONIC COMBINED SYSTOLIC AND DIASTOLIC HEART FAILURE (H): ICD-10-CM

## 2019-05-09 DIAGNOSIS — J45.909 UNCOMPLICATED ASTHMA, UNSPECIFIED ASTHMA SEVERITY, UNSPECIFIED WHETHER PERSISTENT: ICD-10-CM

## 2019-05-09 DIAGNOSIS — R13.10 DYSPHAGIA, UNSPECIFIED TYPE: ICD-10-CM

## 2019-05-09 PROCEDURE — 99310 SBSQ NF CARE HIGH MDM 45: CPT | Performed by: NURSE PRACTITIONER

## 2019-05-09 RX ORDER — FUROSEMIDE 20 MG
20 TABLET ORAL EVERY OTHER DAY
COMMUNITY

## 2019-05-09 RX ORDER — POTASSIUM CHLORIDE 750 MG/1
20 TABLET, EXTENDED RELEASE ORAL EVERY OTHER DAY
COMMUNITY

## 2019-05-09 RX ORDER — PREDNISONE 1 MG/1
TABLET ORAL DAILY
COMMUNITY
End: 2019-05-22

## 2019-05-09 RX ORDER — IPRATROPIUM BROMIDE AND ALBUTEROL SULFATE 2.5; .5 MG/3ML; MG/3ML
1 SOLUTION RESPIRATORY (INHALATION) 4 TIMES DAILY
COMMUNITY

## 2019-05-09 RX ORDER — DOXYCYCLINE HYCLATE 100 MG
100 TABLET ORAL 2 TIMES DAILY
COMMUNITY
Start: 2019-05-09 | End: 2019-05-22

## 2019-05-09 ASSESSMENT — MIFFLIN-ST. JEOR: SCORE: 1047.71

## 2019-05-09 NOTE — PROGRESS NOTES
[facility]  [place] [facility] Record Number:  [id number]  Place of Service where encounter took place:  [facility] [[id number]]  Chief Complaint   Patient presents with     RECHECK       HPI:    Swati Solis  is a 89 year old (12/10/1924), who is being seen today for an episodic care visit.  HPI information obtained from: facility chart records, facility staff, patient report and [facility] chart review.  She came to this facility 4/28/2019 for short term rehab and medical management following hospitalization with shortness of breath and hypoxia.   CXR showed left basilar atelectasis vs consolidation and opacity mid to upper right lung. She was treated with nebs, IV steroids, ceftriaxone and azithromycin with improvement. Discharged on prednisone taper and Ceftin for 5 days. Weaned off O2 prior to hospital discharge. She noted chest heaviness at rest. EKG with paced rhythm. ECHO: EF 50-55%, mid anteroseptal hypokinesis. Further work up was declined by family. Rivaroxaban was changed to apixaban and lisinopril was discontinued due to her CKD.      Today's concern is:     Aspiration pneumonia of both lungs, unspecified aspiration pneumonia type, unspecified part of lung (H)-she is seen today after the nurse reported increased shortness of breath and audible wheezing. Symptoms started after a coughing episode when drinking water. Speech therapist downgraded liquids to nectar after a coughing and choking episode several days ago. O2 was applied and a neb given with improvement. CXR obtained and shows multifocal pneumonia with chronic bronchitis/emphysema changes and small left pleural effusion.  She completed Ceftin 5/3/2019 and prednisone taper yesterday.   Dysphagia, unspecified type  Uncomplicated asthma, unspecified asthma severity, unspecified whether persistent  Chronic combined systolic and diastolic heart failure (H)-weight is down 2 lbs to 149 lbs.   Chronic atrial  fibrillation (H)-HR: 62-89  Pacemaker  CKD (chronic kidney disease) stage 3, GFR 30-59 ml/min (H)  Essential hypertension-BPs: 169/70, 190/82, 159/72, 140/79  Dementia without behavioral disturbance, unspecified dementia type-alert and talkative, but speech is difficult to understand. Reports feeling better after the neb.   Physical deconditioning-ambulating 300 ft with walker and stand by assist. Requires min-max assist of 1 with all cares.     Past Medical and Surgical History reviewed in Epic today.    MEDICATIONS:  Current Outpatient Medications   Medication Sig Dispense Refill     acetaminophen (TYLENOL) 325 MG tablet Take 650 mg by mouth every 4 hours as needed for mild pain       albuterol (PROAIR HFA/PROVENTIL HFA/VENTOLIN HFA) 108 (90 Base) MCG/ACT inhaler Inhale 2 puffs into the lungs every 2 hours as needed for shortness of breath / dyspnea or wheezing       allopurinol (ZYLOPRIM) 100 MG tablet Take 100 mg by mouth daily 0730       apixaban ANTICOAGULANT (ELIQUIS) 2.5 MG tablet Take 1 tablet (2.5 mg) by mouth 2 times daily 60 tablet 1     diltiazem ER (DILT-XR) 120 MG 24 hr capsule Take 120 mg by mouth daily 0800       doxycycline hyclate (VIBRA-TABS) 100 MG tablet Take 100 mg by mouth 2 times daily       escitalopram (LEXAPRO) 5 MG tablet Take 5 mg by mouth daily 2000       fluticasone (FLOVENT DISKUS) 50 MCG/BLIST inhaler Inhale 1 puff into the lungs every 12 hours 0800, 2000       furosemide (LASIX) 20 MG tablet Take 20 mg by mouth every other day       ipratropium - albuterol 0.5 mg/2.5 mg/3 mL (DUONEB) 0.5-2.5 (3) MG/3ML neb solution Take 1 vial by nebulization 4 times daily       latanoprost (XALATAN) 0.005 % ophthalmic solution Place 1 drop into both eyes At Bedtime 2000       lovastatin (MEVACOR) 20 MG tablet Take 20 mg by mouth every evening 1500       potassium chloride ER (K-DUR/KLOR-CON M) 10 MEQ CR tablet Take 20 mEq by mouth every other day Take with lasix       predniSONE (DELTASONE) 1 MG  "tablet Take by mouth daily 40 mg daily x 3, 20 mg daily x 3, 10 mg daily x 3, 5 mg daily x 3 then dc .       senna-docusate (SENOKOT-S/PERICOLACE) 8.6-50 MG tablet Take 1 tablet by mouth 2 times daily as needed for constipation         REVIEW OF SYSTEMS:  4 point ROS including Respiratory, CV, GI and , other than that noted in the HPI,  is negative    Objective:  /70   Pulse 89   Temp 97.9  F (36.6  C)   Resp 16   Ht 1.6 m (5' 3\")   Wt 67.9 kg (149 lb 9.6 oz)   SpO2 94%   BMI 26.50 kg/m    Exam:  GENERAL APPEARANCE:  Alert, in no distress  ENT:  Mouth and posterior oropharynx normal, moist mucous membranes, [place]  EYES:  EOM normal, conjunctiva and lids normal  NECK:  No adenopathy,masses or thyromegaly  RESP:  audible wheezing, crackles and wheezing bilaterally on auscultation, no respiratory distress  CV:  Palpation and auscultation of heart done , regular rate and rhythm, no murmur, no edema  ABDOMEN:  soft, nontender, no hepatosplenomegaly or other masses  M/S:  up in wheelchair, ALONSO with good strength. No joint inflammation  SKIN:  no rashes or open areas  PSYCH:  oriented to self,  situation, insight and judgement impaired, memory impaired , affect and mood normal       Labs:   Lab Results   Component Value Date    WBC 13.6 05/08/2019     Lab Results   Component Value Date    RBC 3.90 05/08/2019     Lab Results   Component Value Date    HGB 11.6 05/08/2019     Lab Results   Component Value Date    HCT 35.9 05/08/2019     Lab Results   Component Value Date    MCV 92 05/08/2019     Lab Results   Component Value Date    MCH 29.7 05/08/2019     Lab Results   Component Value Date    MCHC 32.3 05/08/2019     Lab Results   Component Value Date    RDW 16.5 05/08/2019     Lab Results   Component Value Date     05/08/2019     Last Comprehensive Metabolic Panel:  Sodium   Date Value Ref Range Status   05/08/2019 140 133 - 144 mmol/L Final     Potassium   Date Value Ref Range Status   05/08/2019 3.9 " 3.4 - 5.3 mmol/L Final     Chloride   Date Value Ref Range Status   05/08/2019 108 94 - 109 mmol/L Final     Carbon Dioxide   Date Value Ref Range Status   05/08/2019 26 20 - 32 mmol/L Final     Anion Gap   Date Value Ref Range Status   05/08/2019 6 3 - 14 mmol/L Final     Glucose   Date Value Ref Range Status   05/08/2019 74 70 - 99 mg/dL Final     Urea Nitrogen   Date Value Ref Range Status   05/08/2019 37 (H) 7 - 30 mg/dL Final     Creatinine   Date Value Ref Range Status   05/08/2019 1.18 (H) 0.52 - 1.04 mg/dL Final     GFR Estimate   Date Value Ref Range Status   05/08/2019 39 (L) >60 mL/min/[1.73_m2] Final     Comment:     Non  GFR Calc  Starting 12/18/2018, serum creatinine based estimated GFR (eGFR) will be   calculated using the Chronic Kidney Disease Epidemiology Collaboration   (CKD-EPI) equation.       Calcium   Date Value Ref Range Status   05/08/2019 8.6 8.5 - 10.1 mg/dL Final     ASSESSMENT / PLAN:  (J69.0) Aspiration pneumonia of both lungs, unspecified aspiration pneumonia type, unspecified part of lung (H)  (primary encounter diagnosis)  (R13.10) Dysphagia, unspecified type  (J45.909) Uncomplicated asthma, unspecified asthma severity, unspecified whether persistent  Comment: suspect her previous pneumonia may have also been aspiration. Respiratory status quickly improved today after neb.   Plan: doxycycline for 7 days, prednisone with taper. Duoneb qid. O2 prn. CBC, BMP 5/13/2019. Continue SPEECH THERAPY. Up for meals.   Discussed with her daughter in law/POA Delia Solis who is in [place] and appears to have a good understanding of the situation. Patient has declined in both cognition and functional status since her move from [place] several weeks ago. We discussed potential for recurrent aspiration and she states patient would not want a g tube for feedings. She agrees with current plan of care.     (I50.42) Chronic combined systolic and diastolic heart failure (H)  Comment: small pleural  effusion on CXR, weight stable  Plan: resume lasix at 20 mg every other day. Daily weight. Closely follow renal function.     (I48.2) Chronic atrial fibrillation (H)  (Z95.0) Pacemaker  Comment: rate controlled  Plan: continue diltiazem, apixaban     (N18.3) CKD (chronic kidney disease) stage 3, GFR 30-59 ml/min (H)  Comment: renal function at baseline  Plan: recheck BMP 5/13/2019. Avoid nephrotoxins.     (I10) Essential hypertension  Comment: acceptable control, given her advanced age and fall risk  Plan: continue diltiazem. Lasix as above. Monitor VS    (F03.90) Dementia without behavioral disturbance, unspecified dementia type  Comment: moderate to severe deficits  Plan: supportive care    (R53.81) Physical deconditioning  Comment: slow progress in therapies  Plan: continue PHYSICAL THERAPY/OT. Goal is to return to [facility] with high level services.         Total time spent with patient visit at the skilled nursing facility was 42 mins including patient visit, review of past records and phone call to patient contact. Greater than 50% of total time spent with counseling and coordinating care due to complexity of care, acute issue  Electronically signed by:  JAKE Fraire CNP

## 2019-05-09 NOTE — LETTER
5/9/2019        RE: Swati Solis  [address]  [place] [address]        [facility]  [place] [facility] Record Number:  [id number]  [facility] where encounter took place[facility] [[id number]]  Chief Complaint   Patient presents with     RECHECK       HPI:    Swati Solis  is a 89 year old (12/10/1924), who is being seen today for an episodic care visit.  HPI information obtained from: facility chart records, facility staff, patient report and [facility] chart review.  She came to this facility 4/28/2019 for short term rehab and medical management following hospitalization with shortness of breath and hypoxia.   CXR showed left basilar atelectasis vs consolidation and opacity mid to upper right lung. She was treated with nebs, IV steroids, ceftriaxone and azithromycin with improvement. Discharged on prednisone taper and Ceftin for 5 days. Weaned off O2 prior to hospital discharge. She noted chest heaviness at rest. EKG with paced rhythm. ECHO: EF 50-55%, mid anteroseptal hypokinesis. Further work up was declined by family. Rivaroxaban was changed to apixaban and lisinopril was discontinued due to her CKD.      Today's concern is:     Aspiration pneumonia of both lungs, unspecified aspiration pneumonia type, unspecified part of lung (H)-she is seen today after the nurse reported increased shortness of breath and audible wheezing. Symptoms started after a coughing episode when drinking water. Speech therapist downgraded liquids to nectar after a coughing and choking episode several days ago. O2 was applied and a neb given with improvement. CXR obtained and shows multifocal pneumonia with chronic bronchitis/emphysema changes and small left pleural effusion.  She completed Ceftin 5/3/2019 and prednisone taper yesterday.   Dysphagia, unspecified type  Uncomplicated asthma, unspecified asthma severity, unspecified whether persistent  Chronic combined  systolic and diastolic heart failure (H)-weight is down 2 lbs to 149 lbs.   Chronic atrial fibrillation (H)-HR: 62-89  Pacemaker  CKD (chronic kidney disease) stage 3, GFR 30-59 ml/min (H)  Essential hypertension-BPs: 169/70, 190/82, 159/72, 140/79  Dementia without behavioral disturbance, unspecified dementia type-alert and talkative, but speech is difficult to understand. Reports feeling better after the neb.   Physical deconditioning-ambulating 300 ft with walker and stand by assist. Requires min-max assist of 1 with all cares.     Past Medical and Surgical History reviewed in Epic today.    MEDICATIONS:  Current Outpatient Medications   Medication Sig Dispense Refill     acetaminophen (TYLENOL) 325 MG tablet Take 650 mg by mouth every 4 hours as needed for mild pain       albuterol (PROAIR HFA/PROVENTIL HFA/VENTOLIN HFA) 108 (90 Base) MCG/ACT inhaler Inhale 2 puffs into the lungs every 2 hours as needed for shortness of breath / dyspnea or wheezing       allopurinol (ZYLOPRIM) 100 MG tablet Take 100 mg by mouth daily 0730       apixaban ANTICOAGULANT (ELIQUIS) 2.5 MG tablet Take 1 tablet (2.5 mg) by mouth 2 times daily 60 tablet 1     diltiazem ER (DILT-XR) 120 MG 24 hr capsule Take 120 mg by mouth daily 0800       doxycycline hyclate (VIBRA-TABS) 100 MG tablet Take 100 mg by mouth 2 times daily       escitalopram (LEXAPRO) 5 MG tablet Take 5 mg by mouth daily 2000       fluticasone (FLOVENT DISKUS) 50 MCG/BLIST inhaler Inhale 1 puff into the lungs every 12 hours 0800, 2000       furosemide (LASIX) 20 MG tablet Take 20 mg by mouth every other day       ipratropium - albuterol 0.5 mg/2.5 mg/3 mL (DUONEB) 0.5-2.5 (3) MG/3ML neb solution Take 1 vial by nebulization 4 times daily       latanoprost (XALATAN) 0.005 % ophthalmic solution Place 1 drop into both eyes At Bedtime 2000       lovastatin (MEVACOR) 20 MG tablet Take 20 mg by mouth every evening 1500       potassium chloride ER (K-DUR/KLOR-CON M) 10 MEQ CR  "tablet Take 20 mEq by mouth every other day Take with lasix       predniSONE (DELTASONE) 1 MG tablet Take by mouth daily 40 mg daily x 3, 20 mg daily x 3, 10 mg daily x 3, 5 mg daily x 3 then dc .       senna-docusate (SENOKOT-S/PERICOLACE) 8.6-50 MG tablet Take 1 tablet by mouth 2 times daily as needed for constipation         REVIEW OF SYSTEMS:  4 point ROS including Respiratory, CV, GI and , other than that noted in the HPI,  is negative    Objective:  /70   Pulse 89   Temp 97.9  F (36.6  C)   Resp 16   Ht 1.6 m (5' 3\")   Wt 67.9 kg (149 lb 9.6 oz)   SpO2 94%   BMI 26.50 kg/m     Exam:  GENERAL APPEARANCE:  Alert, in no distress  ENT:  Mouth and posterior oropharynx normal, moist mucous membranes, [place]  EYES:  EOM normal, conjunctiva and lids normal  NECK:  No adenopathy,masses or thyromegaly  RESP:  audible wheezing, crackles and wheezing bilaterally on auscultation, no respiratory distress  CV:  Palpation and auscultation of heart done , regular rate and rhythm, no murmur, no edema  ABDOMEN:  soft, nontender, no hepatosplenomegaly or other masses  M/S:  up in wheelchair, ALONSO with good strength. No joint inflammation  SKIN:  no rashes or open areas  PSYCH:  oriented to self,  situation, insight and judgement impaired, memory impaired , affect and mood normal       Labs:   Lab Results   Component Value Date    WBC 13.6 05/08/2019     Lab Results   Component Value Date    RBC 3.90 05/08/2019     Lab Results   Component Value Date    HGB 11.6 05/08/2019     Lab Results   Component Value Date    HCT 35.9 05/08/2019     Lab Results   Component Value Date    MCV 92 05/08/2019     Lab Results   Component Value Date    MCH 29.7 05/08/2019     Lab Results   Component Value Date    MCHC 32.3 05/08/2019     Lab Results   Component Value Date    RDW 16.5 05/08/2019     Lab Results   Component Value Date     05/08/2019     Last Comprehensive Metabolic Panel:  Sodium   Date Value Ref Range Status "   05/08/2019 140 133 - 144 mmol/L Final     Potassium   Date Value Ref Range Status   05/08/2019 3.9 3.4 - 5.3 mmol/L Final     Chloride   Date Value Ref Range Status   05/08/2019 108 94 - 109 mmol/L Final     Carbon Dioxide   Date Value Ref Range Status   05/08/2019 26 20 - 32 mmol/L Final     Anion Gap   Date Value Ref Range Status   05/08/2019 6 3 - 14 mmol/L Final     Glucose   Date Value Ref Range Status   05/08/2019 74 70 - 99 mg/dL Final     Urea Nitrogen   Date Value Ref Range Status   05/08/2019 37 (H) 7 - 30 mg/dL Final     Creatinine   Date Value Ref Range Status   05/08/2019 1.18 (H) 0.52 - 1.04 mg/dL Final     GFR Estimate   Date Value Ref Range Status   05/08/2019 39 (L) >60 mL/min/[1.73_m2] Final     Comment:     Non  GFR Calc  Starting 12/18/2018, serum creatinine based estimated GFR (eGFR) will be   calculated using the Chronic Kidney Disease Epidemiology Collaboration   (CKD-EPI) equation.       Calcium   Date Value Ref Range Status   05/08/2019 8.6 8.5 - 10.1 mg/dL Final     ASSESSMENT / PLAN:  (J69.0) Aspiration pneumonia of both lungs, unspecified aspiration pneumonia type, unspecified part of lung (H)  (primary encounter diagnosis)  (R13.10) Dysphagia, unspecified type  (J45.909) Uncomplicated asthma, unspecified asthma severity, unspecified whether persistent  Comment: suspect her previous pneumonia may have also been aspiration. Respiratory status quickly improved today after neb.   Plan: doxycycline for 7 days, prednisone with taper. Duoneb qid. O2 prn. CBC, BMP 5/13/2019. Continue SPEECH THERAPY. Up for meals.   Discussed with her daughter in law/POA Delia Solis who is in [place] and appears to have a good understanding of the situation. Patient has declined in both cognition and functional status since her move from [place] several weeks ago. We discussed potential for recurrent aspiration and she states patient would not want a g tube for feedings. She agrees with current plan of  care.     (I50.42) Chronic combined systolic and diastolic heart failure (H)  Comment: small pleural effusion on CXR, weight stable  Plan: resume lasix at 20 mg every other day. Daily weight. Closely follow renal function.     (I48.2) Chronic atrial fibrillation (H)  (Z95.0) Pacemaker  Comment: rate controlled  Plan: continue diltiazem, apixaban     (N18.3) CKD (chronic kidney disease) stage 3, GFR 30-59 ml/min (H)  Comment: renal function at baseline  Plan: recheck BMP 5/13/2019. Avoid nephrotoxins.     (I10) Essential hypertension  Comment: acceptable control, given her advanced age and fall risk  Plan: continue diltiazem. Lasix as above. Monitor VS    (F03.90) Dementia without behavioral disturbance, unspecified dementia type  Comment: moderate to severe deficits  Plan: supportive care    (R53.81) Physical deconditioning  Comment: slow progress in therapies  Plan: continue PHYSICAL THERAPY/OT. Goal is to return [facility] with high level services.         Total time spent with patient visit at the skilled nursing facility was 42 mins including patient visit, review of past records and phone call to patient contact. Greater than 50% of total time spent with counseling and coordinating care due to complexity of care, acute issue  Electronically signed by:  JAKE Fraire CNP           Sincerely,        JAKE Fraire CNP

## 2019-05-13 ENCOUNTER — HOSPITAL LABORATORY (OUTPATIENT)
Dept: OTHER | Facility: CLINIC | Age: 84
End: 2019-05-13

## 2019-05-13 ENCOUNTER — NURSING HOME VISIT (OUTPATIENT)
Dept: GERIATRICS | Facility: CLINIC | Age: 84
End: 2019-05-13
Payer: MEDICARE

## 2019-05-13 VITALS
HEART RATE: 66 BPM | HEIGHT: 63 IN | OXYGEN SATURATION: 93 % | TEMPERATURE: 97.8 F | BODY MASS INDEX: 26.51 KG/M2 | WEIGHT: 149.6 LBS | SYSTOLIC BLOOD PRESSURE: 150 MMHG | RESPIRATION RATE: 18 BRPM | DIASTOLIC BLOOD PRESSURE: 74 MMHG

## 2019-05-13 DIAGNOSIS — F03.90 DEMENTIA WITHOUT BEHAVIORAL DISTURBANCE, UNSPECIFIED DEMENTIA TYPE: ICD-10-CM

## 2019-05-13 DIAGNOSIS — I10 ESSENTIAL HYPERTENSION: ICD-10-CM

## 2019-05-13 DIAGNOSIS — I48.20 CHRONIC ATRIAL FIBRILLATION (H): ICD-10-CM

## 2019-05-13 DIAGNOSIS — R53.81 PHYSICAL DECONDITIONING: ICD-10-CM

## 2019-05-13 DIAGNOSIS — R13.10 DYSPHAGIA, UNSPECIFIED TYPE: ICD-10-CM

## 2019-05-13 DIAGNOSIS — N18.30 CKD (CHRONIC KIDNEY DISEASE) STAGE 3, GFR 30-59 ML/MIN (H): ICD-10-CM

## 2019-05-13 DIAGNOSIS — J69.0 ASPIRATION PNEUMONIA OF BOTH LUNGS, UNSPECIFIED ASPIRATION PNEUMONIA TYPE, UNSPECIFIED PART OF LUNG (H): Primary | ICD-10-CM

## 2019-05-13 DIAGNOSIS — Z95.0 PACEMAKER: ICD-10-CM

## 2019-05-13 DIAGNOSIS — J45.909 UNCOMPLICATED ASTHMA, UNSPECIFIED ASTHMA SEVERITY, UNSPECIFIED WHETHER PERSISTENT: ICD-10-CM

## 2019-05-13 DIAGNOSIS — I50.42 CHRONIC COMBINED SYSTOLIC AND DIASTOLIC HEART FAILURE (H): ICD-10-CM

## 2019-05-13 LAB
ANION GAP SERPL CALCULATED.3IONS-SCNC: 7 MMOL/L (ref 3–14)
BASOPHILS # BLD AUTO: 0 10E9/L (ref 0–0.2)
BASOPHILS NFR BLD AUTO: 0.1 %
BUN SERPL-MCNC: 44 MG/DL (ref 7–30)
CALCIUM SERPL-MCNC: 9.2 MG/DL (ref 8.5–10.1)
CHLORIDE SERPL-SCNC: 105 MMOL/L (ref 94–109)
CO2 SERPL-SCNC: 28 MMOL/L (ref 20–32)
CREAT SERPL-MCNC: 1.36 MG/DL (ref 0.52–1.04)
DIFFERENTIAL METHOD BLD: ABNORMAL
EOSINOPHIL # BLD AUTO: 0 10E9/L (ref 0–0.7)
EOSINOPHIL NFR BLD AUTO: 0.4 %
ERYTHROCYTE [DISTWIDTH] IN BLOOD BY AUTOMATED COUNT: 16.6 % (ref 10–15)
GFR SERPL CREATININE-BSD FRML MDRD: 33 ML/MIN/{1.73_M2}
GLUCOSE SERPL-MCNC: 87 MG/DL (ref 70–99)
HCT VFR BLD AUTO: 34.1 % (ref 35–47)
HGB BLD-MCNC: 11.1 G/DL (ref 11.7–15.7)
IMM GRANULOCYTES # BLD: 0.1 10E9/L (ref 0–0.4)
IMM GRANULOCYTES NFR BLD: 0.5 %
LYMPHOCYTES # BLD AUTO: 1.7 10E9/L (ref 0.8–5.3)
LYMPHOCYTES NFR BLD AUTO: 16.8 %
MCH RBC QN AUTO: 29.6 PG (ref 26.5–33)
MCHC RBC AUTO-ENTMCNC: 32.6 G/DL (ref 31.5–36.5)
MCV RBC AUTO: 91 FL (ref 78–100)
MONOCYTES # BLD AUTO: 0.8 10E9/L (ref 0–1.3)
MONOCYTES NFR BLD AUTO: 7.8 %
NEUTROPHILS # BLD AUTO: 7.5 10E9/L (ref 1.6–8.3)
NEUTROPHILS NFR BLD AUTO: 74.4 %
PLATELET # BLD AUTO: 170 10E9/L (ref 150–450)
POTASSIUM SERPL-SCNC: 4.1 MMOL/L (ref 3.4–5.3)
RBC # BLD AUTO: 3.75 10E12/L (ref 3.8–5.2)
SODIUM SERPL-SCNC: 140 MMOL/L (ref 133–144)
WBC # BLD AUTO: 10 10E9/L (ref 4–11)

## 2019-05-13 PROCEDURE — 99309 SBSQ NF CARE MODERATE MDM 30: CPT | Performed by: NURSE PRACTITIONER

## 2019-05-13 ASSESSMENT — MIFFLIN-ST. JEOR: SCORE: 1047.71

## 2019-05-13 NOTE — PROGRESS NOTES
"[facility]  [place] [facility] Record Number:  [id number]  Place of Service where encounter took place:  [facility] [[id number]]  Chief Complaint   Patient presents with     RECHECK       HPI:    Swati Solis  is a 89 year old (12/10/1924), who is being seen today for an episodic care visit.  HPI information obtained from: facility chart records, facility staff, patient report and [facility] chart review.   She came to this facility 4/28/2019 for short term rehab and medical management following hospitalization with shortness of breath and hypoxia.   CXR showed left basilar atelectasis vs consolidation and opacity mid to upper right lung. She was treated with nebs, IV steroids, ceftriaxone and azithromycin with improvement. Discharged on prednisone taper and Ceftin for 5 days. Weaned off O2 prior to hospital discharge. She noted chest heaviness at rest. EKG with paced rhythm. ECHO: EF 50-55%, mid anteroseptal hypokinesis. Further work up was declined by family. Rivaroxaban was changed to apixaban and lisinopril was discontinued due to her CKD.      Today's concern is:     Aspiration pneumonia of both lungs, unspecified aspiration pneumonia type, unspecified part of lung (H)-she had a coughing episode after drinking water, followed by shortness of breath and audible wheezing on 5/9/2019. CXR showed multifocal pneumonia with chronic bronchitis/emphysema and small left pleural effusion. Doxycycline, prednisone and duonebs were started. Reports feeling better today with less shortness of breath. Chest feels \"congested\", better after a neb. Afebrile. Good appetite.   Dysphagia, unspecified type-tolerating regular diet with nectar thick liquids  Uncomplicated asthma, unspecified asthma severity, unspecified whether persistent  Chronic combined systolic and diastolic heart failure (H)-lasix resumed every other day for increased LE edema and shortness of breath. Weight is unchanged " at 149 lbs.   Chronic atrial fibrillation (H)-HR: 66-89  Pacemaker  CKD (chronic kidney disease) stage 3, GFR 30-59 ml/min (H)  Essential hypertension-BPs: 150/74, 159/82, 143/79  Dementia without behavioral disturbance, unspecified dementia type-pleasant and talkative. Fully participating in therapies. SLUMS 5/30, CPT 4.3/5.6  Physical deconditioning-ambulating 300 ft with walker and stand by assist. Requires assist of 1 with cares.     Past Medical and Surgical History reviewed in Epic today.    MEDICATIONS:  Current Outpatient Medications   Medication Sig Dispense Refill     acetaminophen (TYLENOL) 325 MG tablet Take 650 mg by mouth every 4 hours as needed for mild pain       albuterol (PROAIR HFA/PROVENTIL HFA/VENTOLIN HFA) 108 (90 Base) MCG/ACT inhaler Inhale 2 puffs into the lungs every 2 hours as needed for shortness of breath / dyspnea or wheezing       allopurinol (ZYLOPRIM) 100 MG tablet Take 100 mg by mouth daily 0730       apixaban ANTICOAGULANT (ELIQUIS) 2.5 MG tablet Take 1 tablet (2.5 mg) by mouth 2 times daily 60 tablet 1     diltiazem ER (DILT-XR) 120 MG 24 hr capsule Take 120 mg by mouth daily 0800       doxycycline hyclate (VIBRA-TABS) 100 MG tablet Take 100 mg by mouth 2 times daily       escitalopram (LEXAPRO) 5 MG tablet Take 5 mg by mouth daily 2000       fluticasone (FLOVENT DISKUS) 50 MCG/BLIST inhaler Inhale 1 puff into the lungs every 12 hours 0800, 2000       furosemide (LASIX) 20 MG tablet Take 20 mg by mouth every other day       ipratropium - albuterol 0.5 mg/2.5 mg/3 mL (DUONEB) 0.5-2.5 (3) MG/3ML neb solution Take 1 vial by nebulization 4 times daily       latanoprost (XALATAN) 0.005 % ophthalmic solution Place 1 drop into both eyes At Bedtime 2000       lovastatin (MEVACOR) 20 MG tablet Take 20 mg by mouth every evening 1500       potassium chloride ER (K-DUR/KLOR-CON M) 10 MEQ CR tablet Take 20 mEq by mouth every other day Take with lasix       predniSONE (DELTASONE) 1 MG tablet  "Take by mouth daily 40 mg daily x 3, 20 mg daily x 3, 10 mg daily x 3, 5 mg daily x 3 then dc .       senna-docusate (SENOKOT-S/PERICOLACE) 8.6-50 MG tablet Take 1 tablet by mouth 2 times daily as needed for constipation           REVIEW OF SYSTEMS:  4 point ROS including Respiratory, CV, GI and , other than that noted in the HPI,  is negative    Objective:  /74   Pulse 66   Temp 97.8  F (36.6  C)   Resp 18   Ht 1.6 m (5' 3\")   Wt 67.9 kg (149 lb 9.6 oz)   SpO2 93%   BMI 26.50 kg/m    Exam:  GENERAL APPEARANCE:  Alert, in no distress  ENT:  Mouth and posterior oropharynx normal, moist mucous membranes, [place]  EYES:  EOM normal, conjunctiva and lids normal  NECK:  No adenopathy,masses or thyromegaly  RESP:  respiratory effort and palpation of chest normal, no respiratory distress, lungs clear to auscultation, no wheezes  CV:  Palpation and auscultation of heart done , regular rate and rhythm, no murmur, trace edema  ABDOMEN:  soft, nontender, no hepatosplenomegaly or other masses  M/S:  up in wheelchair, ALONSO with good strength. No joint inflammation  SKIN:  no rashes or open areas  PSYCH:  oriented to self,  situation, insight and judgement impaired, memory impaired , affect and mood normal         Labs:   Lab Results   Component Value Date    WBC 10.0 05/13/2019     Lab Results   Component Value Date    RBC 3.75 05/13/2019     Lab Results   Component Value Date    HGB 11.1 05/13/2019     Lab Results   Component Value Date    HCT 34.1 05/13/2019     Lab Results   Component Value Date    MCV 91 05/13/2019     Lab Results   Component Value Date    MCH 29.6 05/13/2019     Lab Results   Component Value Date    MCHC 32.6 05/13/2019     Lab Results   Component Value Date    RDW 16.6 05/13/2019     Lab Results   Component Value Date     05/13/2019     Last Comprehensive Metabolic Panel:  Sodium   Date Value Ref Range Status   05/13/2019 140 133 - 144 mmol/L Final     Potassium   Date Value Ref Range " Status   05/13/2019 4.1 3.4 - 5.3 mmol/L Final     Comment:     Specimen slightly hemolyzed, potassium may be falsely elevated     Chloride   Date Value Ref Range Status   05/13/2019 105 94 - 109 mmol/L Final     Carbon Dioxide   Date Value Ref Range Status   05/13/2019 28 20 - 32 mmol/L Final     Anion Gap   Date Value Ref Range Status   05/13/2019 7 3 - 14 mmol/L Final     Glucose   Date Value Ref Range Status   05/13/2019 87 70 - 99 mg/dL Final     Urea Nitrogen   Date Value Ref Range Status   05/13/2019 44 (H) 7 - 30 mg/dL Final     Creatinine   Date Value Ref Range Status   05/13/2019 1.36 (H) 0.52 - 1.04 mg/dL Final     GFR Estimate   Date Value Ref Range Status   05/13/2019 33 (L) >60 mL/min/[1.73_m2] Final     Comment:     Non  GFR Calc  Starting 12/18/2018, serum creatinine based estimated GFR (eGFR) will be   calculated using the Chronic Kidney Disease Epidemiology Collaboration   (CKD-EPI) equation.       Calcium   Date Value Ref Range Status   05/13/2019 9.2 8.5 - 10.1 mg/dL Final         ASSESSMENT / PLAN:  (J69.0) Aspiration pneumonia of both lungs, unspecified aspiration pneumonia type, unspecified part of lung (H)  (primary encounter diagnosis)  (R13.10) Dysphagia, unspecified type  (J45.909) Uncomplicated asthma, unspecified asthma severity, unspecified whether persistent  Comment: respiratory status improved. She looks better today.   Plan: continue doxycycline, nebs, prednisone taper.  Continue SPEECH THERAPY. Closely monitor respiratory status.     (I50.42) Chronic combined systolic and diastolic heart failure (H)  Comment: LE edema improved. Weight unchanged  Plan: continue lasix every other day. Daily weight.     (I48.2) Chronic atrial fibrillation (H)  (Z95.0) Pacemaker  Comment: rate controlled  Plan: continue diltiazem, apixaban     (N18.3) CKD (chronic kidney disease) stage 3, GFR 30-59 ml/min (H)  Comment: renal function at baseline  Plan: follow BMP. Avoid nephrotoxins.      (I10) Essential hypertension  Comment: acceptable control  Plan: continue diltiazem, lasix. Monitor VS    (F03.90) Dementia without behavioral disturbance, unspecified dementia type  Comment: moderate deficits. More alert and interactive today  Plan: supportive care    (R53.81) Physical deconditioning  Comment: progressing in therapies  Plan: continue PHYSICAL THERAPY/OT. Goal is to return to [facility] with services.       Electronically signed by:  JAKE Fraire CNP

## 2019-05-13 NOTE — LETTER
"    5/13/2019        RE: Swati Solis  [address]  [place] [address]        [facility]  [place] [facility] Record Number:  [id number]  [facility] where encounter took place[facility] [[id number]]  Chief Complaint   Patient presents with     RECHECK       HPI:    Swati Solis  is a 89 year old (12/10/1924), who is being seen today for an episodic care visit.  HPI information obtained from: facility chart records, facility staff, patient report and [facility] chart review.   She came to this facility 4/28/2019 for short term rehab and medical management following hospitalization with shortness of breath and hypoxia.   CXR showed left basilar atelectasis vs consolidation and opacity mid to upper right lung. She was treated with nebs, IV steroids, ceftriaxone and azithromycin with improvement. Discharged on prednisone taper and Ceftin for 5 days. Weaned off O2 prior to hospital discharge. She noted chest heaviness at rest. EKG with paced rhythm. ECHO: EF 50-55%, mid anteroseptal hypokinesis. Further work up was declined by family. Rivaroxaban was changed to apixaban and lisinopril was discontinued due to her CKD.      Today's concern is:     Aspiration pneumonia of both lungs, unspecified aspiration pneumonia type, unspecified part of lung (H)-she had a coughing episode after drinking water, followed by shortness of breath and audible wheezing on 5/9/2019. CXR showed multifocal pneumonia with chronic bronchitis/emphysema and small left pleural effusion. Doxycycline, prednisone and duonebs were started. Reports feeling better today with less shortness of breath. Chest feels \"congested\", better after a neb. Afebrile. Good appetite.   Dysphagia, unspecified type-tolerating regular diet with nectar thick liquids  Uncomplicated asthma, unspecified asthma severity, unspecified whether persistent  Chronic combined systolic and diastolic heart failure (H)-lasix " resumed every other day for increased LE edema and shortness of breath. Weight is unchanged at 149 lbs.   Chronic atrial fibrillation (H)-HR: 66-89  Pacemaker  CKD (chronic kidney disease) stage 3, GFR 30-59 ml/min (H)  Essential hypertension-BPs: 150/74, 159/82, 143/79  Dementia without behavioral disturbance, unspecified dementia type-pleasant and talkative. Fully participating in therapies. SLUMS 5/30, CPT 4.3/5.6  Physical deconditioning-ambulating 300 ft with walker and stand by assist. Requires assist of 1 with cares.     Past Medical and Surgical History reviewed in Epic today.    MEDICATIONS:  Current Outpatient Medications   Medication Sig Dispense Refill     acetaminophen (TYLENOL) 325 MG tablet Take 650 mg by mouth every 4 hours as needed for mild pain       albuterol (PROAIR HFA/PROVENTIL HFA/VENTOLIN HFA) 108 (90 Base) MCG/ACT inhaler Inhale 2 puffs into the lungs every 2 hours as needed for shortness of breath / dyspnea or wheezing       allopurinol (ZYLOPRIM) 100 MG tablet Take 100 mg by mouth daily 0730       apixaban ANTICOAGULANT (ELIQUIS) 2.5 MG tablet Take 1 tablet (2.5 mg) by mouth 2 times daily 60 tablet 1     diltiazem ER (DILT-XR) 120 MG 24 hr capsule Take 120 mg by mouth daily 0800       doxycycline hyclate (VIBRA-TABS) 100 MG tablet Take 100 mg by mouth 2 times daily       escitalopram (LEXAPRO) 5 MG tablet Take 5 mg by mouth daily 2000       fluticasone (FLOVENT DISKUS) 50 MCG/BLIST inhaler Inhale 1 puff into the lungs every 12 hours 0800, 2000       furosemide (LASIX) 20 MG tablet Take 20 mg by mouth every other day       ipratropium - albuterol 0.5 mg/2.5 mg/3 mL (DUONEB) 0.5-2.5 (3) MG/3ML neb solution Take 1 vial by nebulization 4 times daily       latanoprost (XALATAN) 0.005 % ophthalmic solution Place 1 drop into both eyes At Bedtime 2000       lovastatin (MEVACOR) 20 MG tablet Take 20 mg by mouth every evening 1500       potassium chloride ER (K-DUR/KLOR-CON M) 10 MEQ CR tablet Take  "20 mEq by mouth every other day Take with lasix       predniSONE (DELTASONE) 1 MG tablet Take by mouth daily 40 mg daily x 3, 20 mg daily x 3, 10 mg daily x 3, 5 mg daily x 3 then dc .       senna-docusate (SENOKOT-S/PERICOLACE) 8.6-50 MG tablet Take 1 tablet by mouth 2 times daily as needed for constipation           REVIEW OF SYSTEMS:  4 point ROS including Respiratory, CV, GI and , other than that noted in the HPI,  is negative    Objective:  /74   Pulse 66   Temp 97.8  F (36.6  C)   Resp 18   Ht 1.6 m (5' 3\")   Wt 67.9 kg (149 lb 9.6 oz)   SpO2 93%   BMI 26.50 kg/m     Exam:  GENERAL APPEARANCE:  Alert, in no distress  ENT:  Mouth and posterior oropharynx normal, moist mucous membranes, [place]  EYES:  EOM normal, conjunctiva and lids normal  NECK:  No adenopathy,masses or thyromegaly  RESP:  respiratory effort and palpation of chest normal, no respiratory distress, lungs clear to auscultation, no wheezes  CV:  Palpation and auscultation of heart done , regular rate and rhythm, no murmur, trace edema  ABDOMEN:  soft, nontender, no hepatosplenomegaly or other masses  M/S:  up in wheelchair, ALONSO with good strength. No joint inflammation  SKIN:  no rashes or open areas  PSYCH:  oriented to self,  situation, insight and judgement impaired, memory impaired , affect and mood normal         Labs:   Lab Results   Component Value Date    WBC 10.0 05/13/2019     Lab Results   Component Value Date    RBC 3.75 05/13/2019     Lab Results   Component Value Date    HGB 11.1 05/13/2019     Lab Results   Component Value Date    HCT 34.1 05/13/2019     Lab Results   Component Value Date    MCV 91 05/13/2019     Lab Results   Component Value Date    MCH 29.6 05/13/2019     Lab Results   Component Value Date    MCHC 32.6 05/13/2019     Lab Results   Component Value Date    RDW 16.6 05/13/2019     Lab Results   Component Value Date     05/13/2019     Last Comprehensive Metabolic Panel:  Sodium   Date Value Ref " Range Status   05/13/2019 140 133 - 144 mmol/L Final     Potassium   Date Value Ref Range Status   05/13/2019 4.1 3.4 - 5.3 mmol/L Final     Comment:     Specimen slightly hemolyzed, potassium may be falsely elevated     Chloride   Date Value Ref Range Status   05/13/2019 105 94 - 109 mmol/L Final     Carbon Dioxide   Date Value Ref Range Status   05/13/2019 28 20 - 32 mmol/L Final     Anion Gap   Date Value Ref Range Status   05/13/2019 7 3 - 14 mmol/L Final     Glucose   Date Value Ref Range Status   05/13/2019 87 70 - 99 mg/dL Final     Urea Nitrogen   Date Value Ref Range Status   05/13/2019 44 (H) 7 - 30 mg/dL Final     Creatinine   Date Value Ref Range Status   05/13/2019 1.36 (H) 0.52 - 1.04 mg/dL Final     GFR Estimate   Date Value Ref Range Status   05/13/2019 33 (L) >60 mL/min/[1.73_m2] Final     Comment:     Non  GFR Calc  Starting 12/18/2018, serum creatinine based estimated GFR (eGFR) will be   calculated using the Chronic Kidney Disease Epidemiology Collaboration   (CKD-EPI) equation.       Calcium   Date Value Ref Range Status   05/13/2019 9.2 8.5 - 10.1 mg/dL Final         ASSESSMENT / PLAN:  (J69.0) Aspiration pneumonia of both lungs, unspecified aspiration pneumonia type, unspecified part of lung (H)  (primary encounter diagnosis)  (R13.10) Dysphagia, unspecified type  (J45.909) Uncomplicated asthma, unspecified asthma severity, unspecified whether persistent  Comment: respiratory status improved. She looks better today.   Plan: continue doxycycline, nebs, prednisone taper.  Continue SPEECH THERAPY. Closely monitor respiratory status.     (I50.42) Chronic combined systolic and diastolic heart failure (H)  Comment: LE edema improved. Weight unchanged  Plan: continue lasix every other day. Daily weight.     (I48.2) Chronic atrial fibrillation (H)  (Z95.0) Pacemaker  Comment: rate controlled  Plan: continue diltiazem, apixaban     (N18.3) CKD (chronic kidney disease) stage 3, GFR 30-59  ml/min (H)  Comment: renal function at baseline  Plan: follow BMP. Avoid nephrotoxins.     (I10) Essential hypertension  Comment: acceptable control  Plan: continue diltiazem, lasix. Monitor VS    (F03.90) Dementia without behavioral disturbance, unspecified dementia type  Comment: moderate deficits. More alert and interactive today  Plan: supportive care    (R53.81) Physical deconditioning  Comment: progressing in therapies  Plan: continue PHYSICAL THERAPY/OT. Goal is to return to [facility] with services.       Electronically signed by:  JAKE Fraire CNP           Sincerely,        JAKE Fraire CNP

## 2019-05-16 ENCOUNTER — HOSPITAL LABORATORY (OUTPATIENT)
Dept: OTHER | Facility: CLINIC | Age: 84
End: 2019-05-16

## 2019-05-16 ENCOUNTER — TELEPHONE (OUTPATIENT)
Dept: GERIATRICS | Facility: CLINIC | Age: 84
End: 2019-05-16

## 2019-05-16 ENCOUNTER — NURSING HOME VISIT (OUTPATIENT)
Dept: GERIATRICS | Facility: CLINIC | Age: 84
End: 2019-05-16
Payer: MEDICARE

## 2019-05-16 VITALS
WEIGHT: 150 LBS | OXYGEN SATURATION: 97 % | HEIGHT: 63 IN | SYSTOLIC BLOOD PRESSURE: 158 MMHG | RESPIRATION RATE: 18 BRPM | HEART RATE: 67 BPM | BODY MASS INDEX: 26.58 KG/M2 | TEMPERATURE: 97.9 F | DIASTOLIC BLOOD PRESSURE: 69 MMHG

## 2019-05-16 DIAGNOSIS — I50.42 CHRONIC COMBINED SYSTOLIC AND DIASTOLIC HEART FAILURE (H): ICD-10-CM

## 2019-05-16 DIAGNOSIS — R53.81 PHYSICAL DECONDITIONING: ICD-10-CM

## 2019-05-16 DIAGNOSIS — F03.90 DEMENTIA WITHOUT BEHAVIORAL DISTURBANCE, UNSPECIFIED DEMENTIA TYPE: ICD-10-CM

## 2019-05-16 DIAGNOSIS — N18.30 CKD (CHRONIC KIDNEY DISEASE) STAGE 3, GFR 30-59 ML/MIN (H): ICD-10-CM

## 2019-05-16 DIAGNOSIS — J69.0 ASPIRATION PNEUMONIA OF BOTH LUNGS, UNSPECIFIED ASPIRATION PNEUMONIA TYPE, UNSPECIFIED PART OF LUNG (H): Primary | ICD-10-CM

## 2019-05-16 DIAGNOSIS — I48.20 CHRONIC ATRIAL FIBRILLATION (H): ICD-10-CM

## 2019-05-16 DIAGNOSIS — R13.10 DYSPHAGIA, UNSPECIFIED TYPE: ICD-10-CM

## 2019-05-16 DIAGNOSIS — Z95.0 PACEMAKER: ICD-10-CM

## 2019-05-16 DIAGNOSIS — I10 ESSENTIAL HYPERTENSION: ICD-10-CM

## 2019-05-16 DIAGNOSIS — J45.909 UNCOMPLICATED ASTHMA, UNSPECIFIED ASTHMA SEVERITY, UNSPECIFIED WHETHER PERSISTENT: ICD-10-CM

## 2019-05-16 LAB
ANION GAP SERPL CALCULATED.3IONS-SCNC: 10 MMOL/L (ref 3–14)
BUN SERPL-MCNC: 52 MG/DL (ref 7–30)
CALCIUM SERPL-MCNC: 9.4 MG/DL (ref 8.5–10.1)
CHLORIDE SERPL-SCNC: 103 MMOL/L (ref 94–109)
CO2 SERPL-SCNC: 27 MMOL/L (ref 20–32)
CREAT SERPL-MCNC: 1.24 MG/DL (ref 0.52–1.04)
GFR SERPL CREATININE-BSD FRML MDRD: 37 ML/MIN/{1.73_M2}
GLUCOSE SERPL-MCNC: 142 MG/DL (ref 70–99)
POTASSIUM SERPL-SCNC: 4.1 MMOL/L (ref 3.4–5.3)
SODIUM SERPL-SCNC: 140 MMOL/L (ref 133–144)

## 2019-05-16 PROCEDURE — 99309 SBSQ NF CARE MODERATE MDM 30: CPT | Performed by: NURSE PRACTITIONER

## 2019-05-16 RX ORDER — GUAIFENESIN 600 MG/1
1200 TABLET, EXTENDED RELEASE ORAL 2 TIMES DAILY
COMMUNITY
Start: 2019-05-16 | End: 2019-05-22

## 2019-05-16 ASSESSMENT — MIFFLIN-ST. JEOR: SCORE: 1049.53

## 2019-05-16 NOTE — TELEPHONE ENCOUNTER
Nursing not sure if primary NP saw labs but calling in the BMP and most notable is BUN = 52 and Cr = 1.24  Was recently started on Doxycycline 100mg BID for pneumonia.    Will route note to primary NP    Electronically signed by Shirin Christensen RN, CNP

## 2019-05-16 NOTE — LETTER
"    5/16/2019        RE: Swati Solis  [address] [facility] [address]  [place] [address]        [facility]  [facility] Record Number:  [id number]  [facility] of [facility] where encounter took place[facility] [[id number]]  Chief Complaint   Patient presents with     RECHECK       HPI:    Swati Solis  is a 89 year old (12/10/1924), who is being seen today for an episodic care visit.  HPI information obtained from: facility chart records, facility staff, patient report and [facility] chart review.   She came to this facility 4/28/2019 for short term rehab and medical management following hospitalization with shortness of breath and hypoxia.   CXR showed left basilar atelectasis vs consolidation and opacity mid to upper right lung. She was treated with nebs, IV steroids, ceftriaxone and azithromycin with improvement. Discharged on prednisone taper and Ceftin for 5 days. Weaned off O2 prior to hospital discharge. She noted chest heaviness at rest. EKG with paced rhythm. ECHO: EF 50-55%, mid anteroseptal hypokinesis. Further work up was declined by family. Rivaroxaban was changed to apixaban and lisinopril was discontinued due to her CKD.      Today's concern is:     Aspiration pneumonia of both lungs, unspecified aspiration pneumonia type, unspecified part of lung (H)-she had a coughing episode while drinking water 5/9/2019 and had increased shortness of breath and wheezing. CXR showed multifocal pneumonia with chronic bronchitis/emphysema. Doxycycline, prednisone and nebs were started. Reports feeling better with less coughing. Chest feels \"congested.\" Denies chest pain or heaviness. Afebrile. Remains on O2 at 1-2 liters. Tolerating regular diet with nectar thick liquids.   Dysphagia, unspecified type  Uncomplicated asthma, unspecified asthma severity, unspecified whether persistent  Chronic combined systolic and diastolic heart failure (H)-lasix every other day started " 5/9/2019 for dyspnea and increased LE edema. Weight today is 150 lbs, up 1 lb.   Chronic atrial fibrillation (H)-HR: 62-68  Pacemaker  CKD (chronic kidney disease) stage 3, GFR 30-59 ml/min (H)  Essential hypertension-BPs: 158/69, 143/62, 150/74  Dementia without behavioral disturbance, unspecified dementia type-pleasant and cooperative with cares. SLUMS 5/30, CPT 4.3/5.6  Physical deconditioning-ambulating short distances with walker and stand by to min assist. Requires contact guard to mod assist with cares.     Past Medical and Surgical History reviewed in Epic today.    MEDICATIONS:  Current Outpatient Medications   Medication Sig Dispense Refill     acetaminophen (TYLENOL) 325 MG tablet Take 650 mg by mouth every 4 hours as needed for mild pain       albuterol (PROAIR HFA/PROVENTIL HFA/VENTOLIN HFA) 108 (90 Base) MCG/ACT inhaler Inhale 2 puffs into the lungs every 2 hours as needed for shortness of breath / dyspnea or wheezing       allopurinol (ZYLOPRIM) 100 MG tablet Take 100 mg by mouth daily 0730       apixaban ANTICOAGULANT (ELIQUIS) 2.5 MG tablet Take 1 tablet (2.5 mg) by mouth 2 times daily 60 tablet 1     diltiazem ER (DILT-XR) 120 MG 24 hr capsule Take 120 mg by mouth daily 0800       doxycycline hyclate (VIBRA-TABS) 100 MG tablet Take 100 mg by mouth 2 times daily       escitalopram (LEXAPRO) 5 MG tablet Take 5 mg by mouth daily 2000       fluticasone (FLOVENT DISKUS) 50 MCG/BLIST inhaler Inhale 1 puff into the lungs every 12 hours 0800, 2000       furosemide (LASIX) 20 MG tablet Take 20 mg by mouth every other day       guaiFENesin (MUCINEX) 600 MG 12 hr tablet Take 1,200 mg by mouth 2 times daily       ipratropium - albuterol 0.5 mg/2.5 mg/3 mL (DUONEB) 0.5-2.5 (3) MG/3ML neb solution Take 1 vial by nebulization 4 times daily       latanoprost (XALATAN) 0.005 % ophthalmic solution Place 1 drop into both eyes At Bedtime 2000       lovastatin (MEVACOR) 20 MG tablet Take 20 mg by mouth every evening  "1500       potassium chloride ER (K-DUR/KLOR-CON M) 10 MEQ CR tablet Take 20 mEq by mouth every other day Take with lasix       predniSONE (DELTASONE) 1 MG tablet Take by mouth daily 40 mg daily x 3, 20 mg daily x 3, 10 mg daily x 3, 5 mg daily x 3 then dc .       senna-docusate (SENOKOT-S/PERICOLACE) 8.6-50 MG tablet Take 1 tablet by mouth 2 times daily as needed for constipation           REVIEW OF SYSTEMS:  4 point ROS including Respiratory, CV, GI and , other than that noted in the HPI,  is negative    Objective:  /69   Pulse 67   Temp 97.9  F (36.6  C)   Resp 18   Ht 1.6 m (5' 3\")   Wt 68 kg (150 lb)   SpO2 97%   BMI 26.57 kg/m     Exam:  GENERAL APPEARANCE:  Alert, in no distress  ENT:  Mouth and posterior oropharynx normal, moist mucous membranes, [place]  EYES:  EOM normal, conjunctiva and lids normal  NECK:  No adenopathy,masses or thyromegaly  RESP:  respiratory effort and palpation of chest normal, no respiratory distress, lungs clear to auscultation, no wheezes  CV:  Palpation and auscultation of heart done , regular rate and rhythm, no murmur, trace edema  ABDOMEN:  soft, nontender, no hepatosplenomegaly or other masses  M/S:  up in wheelchair, ALONSO with good strength. No joint inflammation  SKIN:  no rashes or open areas  PSYCH:  oriented to self,  situation, insight and judgement impaired, memory impaired , affect and mood normal     Labs:   Lab Results   Component Value Date    WBC 10.0 05/13/2019     Lab Results   Component Value Date    RBC 3.75 05/13/2019     Lab Results   Component Value Date    HGB 11.1 05/13/2019     Lab Results   Component Value Date    HCT 34.1 05/13/2019     Lab Results   Component Value Date    MCV 91 05/13/2019     Lab Results   Component Value Date    MCH 29.6 05/13/2019     Lab Results   Component Value Date    MCHC 32.6 05/13/2019     Lab Results   Component Value Date    RDW 16.6 05/13/2019     Lab Results   Component Value Date     05/13/2019     Last " Comprehensive Metabolic Panel:  Sodium   Date Value Ref Range Status   05/16/2019 140 133 - 144 mmol/L Final     Potassium   Date Value Ref Range Status   05/16/2019 4.1 3.4 - 5.3 mmol/L Final     Chloride   Date Value Ref Range Status   05/16/2019 103 94 - 109 mmol/L Final     Carbon Dioxide   Date Value Ref Range Status   05/16/2019 27 20 - 32 mmol/L Final     Anion Gap   Date Value Ref Range Status   05/16/2019 10 3 - 14 mmol/L Final     Glucose   Date Value Ref Range Status   05/16/2019 142 (H) 70 - 99 mg/dL Final     Urea Nitrogen   Date Value Ref Range Status   05/16/2019 52 (H) 7 - 30 mg/dL Final     Creatinine   Date Value Ref Range Status   05/16/2019 1.24 (H) 0.52 - 1.04 mg/dL Final     GFR Estimate   Date Value Ref Range Status   05/16/2019 37 (L) >60 mL/min/[1.73_m2] Final     Comment:     Non  GFR Calc  Starting 12/18/2018, serum creatinine based estimated GFR (eGFR) will be   calculated using the Chronic Kidney Disease Epidemiology Collaboration   (CKD-EPI) equation.       Calcium   Date Value Ref Range Status   05/16/2019 9.4 8.5 - 10.1 mg/dL Final       ASSESSMENT / PLAN:  (J69.0) Aspiration pneumonia of both lungs, unspecified aspiration pneumonia type, unspecified part of lung (H)  (primary encounter diagnosis)  (R13.10) Dysphagia, unspecified type  (J45.909) Uncomplicated asthma, unspecified asthma severity, unspecified whether persistent  Comment: respiratory status improving, remains on continuous O2.   Plan: continue doxycycline for 10 days total. Continue nebs, prednisone taper. Mucinex for 5 days. Wean O2 as tolerated to maintain sats >90%. Continue SPEECH THERAPY.     (I50.42) Chronic combined systolic and diastolic heart failure (H)  Comment: weight unchanged. Edema and dyspnea improved  Plan: continue lasix every other day. Daily weight.     (I48.2) Chronic atrial fibrillation (H)  (Z95.0) Pacemaker  Comment: rate controlled.   Plan: continue diltiazem, apixaban     (N18.3)  CKD (chronic kidney disease) stage 3, GFR 30-59 ml/min (H)  Comment: renal function at baseline  Plan: follow BMP. Avoid nephrotoxins.     (I10) Essential hypertension  Comment: acceptable control, given her advanced age and fall risk  Plan: continue diltiazem, lasix. Monitor VS    (F03.90) Dementia without behavioral disturbance, unspecified dementia type  Comment: moderate deficits. Cognition has improved since tcu admission  Plan: supportive care     (R53.81) Physical deconditioning  Comment: slowly progressing in therapies  Plan: continue PHYSICAL THERAPY/OT. Goal is to return to [facility] with services.       Electronically signed by:  JAKE Fraire CNP        Sincerely,        JAKE Fraire CNP

## 2019-05-16 NOTE — PROGRESS NOTES
"[facility]  [facility] Record Number:  [id number]  [facility] of Service where encounter took place[facility] [[id number]]  Chief Complaint   Patient presents with     RECHECK       HPI:    Swati Solis  is a 89 year old (12/10/1924), who is being seen today for an episodic care visit.  HPI information obtained from: facility chart records, facility staff, patient report and [facility] chart review.   She came to this facility 4/28/2019 for short term rehab and medical management following hospitalization with shortness of breath and hypoxia.   CXR showed left basilar atelectasis vs consolidation and opacity mid to upper right lung. She was treated with nebs, IV steroids, ceftriaxone and azithromycin with improvement. Discharged on prednisone taper and Ceftin for 5 days. Weaned off O2 prior to hospital discharge. She noted chest heaviness at rest. EKG with paced rhythm. ECHO: EF 50-55%, mid anteroseptal hypokinesis. Further work up was declined by family. Rivaroxaban was changed to apixaban and lisinopril was discontinued due to her CKD.      Today's concern is:     Aspiration pneumonia of both lungs, unspecified aspiration pneumonia type, unspecified part of lung (H)-she had a coughing episode while drinking water 5/9/2019 and had increased shortness of breath and wheezing. CXR showed multifocal pneumonia with chronic bronchitis/emphysema. Doxycycline, prednisone and nebs were started. Reports feeling better with less coughing. Chest feels \"congested.\" Denies chest pain or heaviness. Afebrile. Remains on O2 at 1-2 liters. Tolerating regular diet with nectar thick liquids.   Dysphagia, unspecified type  Uncomplicated asthma, unspecified asthma severity, unspecified whether persistent  Chronic combined systolic and diastolic heart failure (H)-lasix every other day started 5/9/2019 for dyspnea and increased LE edema. Weight today is 150 lbs, up 1 lb.   Chronic atrial " fibrillation (H)-HR: 62-68  Pacemaker  CKD (chronic kidney disease) stage 3, GFR 30-59 ml/min (H)  Essential hypertension-BPs: 158/69, 143/62, 150/74  Dementia without behavioral disturbance, unspecified dementia type-pleasant and cooperative with cares. SLUMS 5/30, CPT 4.3/5.6  Physical deconditioning-ambulating short distances with walker and stand by to min assist. Requires contact guard to mod assist with cares.     Past Medical and Surgical History reviewed in Epic today.    MEDICATIONS:  Current Outpatient Medications   Medication Sig Dispense Refill     acetaminophen (TYLENOL) 325 MG tablet Take 650 mg by mouth every 4 hours as needed for mild pain       albuterol (PROAIR HFA/PROVENTIL HFA/VENTOLIN HFA) 108 (90 Base) MCG/ACT inhaler Inhale 2 puffs into the lungs every 2 hours as needed for shortness of breath / dyspnea or wheezing       allopurinol (ZYLOPRIM) 100 MG tablet Take 100 mg by mouth daily 0730       apixaban ANTICOAGULANT (ELIQUIS) 2.5 MG tablet Take 1 tablet (2.5 mg) by mouth 2 times daily 60 tablet 1     diltiazem ER (DILT-XR) 120 MG 24 hr capsule Take 120 mg by mouth daily 0800       doxycycline hyclate (VIBRA-TABS) 100 MG tablet Take 100 mg by mouth 2 times daily       escitalopram (LEXAPRO) 5 MG tablet Take 5 mg by mouth daily 2000       fluticasone (FLOVENT DISKUS) 50 MCG/BLIST inhaler Inhale 1 puff into the lungs every 12 hours 0800, 2000       furosemide (LASIX) 20 MG tablet Take 20 mg by mouth every other day       guaiFENesin (MUCINEX) 600 MG 12 hr tablet Take 1,200 mg by mouth 2 times daily       ipratropium - albuterol 0.5 mg/2.5 mg/3 mL (DUONEB) 0.5-2.5 (3) MG/3ML neb solution Take 1 vial by nebulization 4 times daily       latanoprost (XALATAN) 0.005 % ophthalmic solution Place 1 drop into both eyes At Bedtime 2000       lovastatin (MEVACOR) 20 MG tablet Take 20 mg by mouth every evening 1500       potassium chloride ER (K-DUR/KLOR-CON M) 10 MEQ CR tablet Take 20 mEq by mouth every  "other day Take with lasix       predniSONE (DELTASONE) 1 MG tablet Take by mouth daily 40 mg daily x 3, 20 mg daily x 3, 10 mg daily x 3, 5 mg daily x 3 then dc .       senna-docusate (SENOKOT-S/PERICOLACE) 8.6-50 MG tablet Take 1 tablet by mouth 2 times daily as needed for constipation           REVIEW OF SYSTEMS:  4 point ROS including Respiratory, CV, GI and , other than that noted in the HPI,  is negative    Objective:  /69   Pulse 67   Temp 97.9  F (36.6  C)   Resp 18   Ht 1.6 m (5' 3\")   Wt 68 kg (150 lb)   SpO2 97%   BMI 26.57 kg/m    Exam:  GENERAL APPEARANCE:  Alert, in no distress  ENT:  Mouth and posterior oropharynx normal, moist mucous membranes, [place]  EYES:  EOM normal, conjunctiva and lids normal  NECK:  No adenopathy,masses or thyromegaly  RESP:  respiratory effort and palpation of chest normal, no respiratory distress, lungs clear to auscultation, no wheezes  CV:  Palpation and auscultation of heart done , regular rate and rhythm, no murmur, trace edema  ABDOMEN:  soft, nontender, no hepatosplenomegaly or other masses  M/S:  up in wheelchair, ALONSO with good strength. No joint inflammation  SKIN:  no rashes or open areas  PSYCH:  oriented to self,  situation, insight and judgement impaired, memory impaired , affect and mood normal     Labs:   Lab Results   Component Value Date    WBC 10.0 05/13/2019     Lab Results   Component Value Date    RBC 3.75 05/13/2019     Lab Results   Component Value Date    HGB 11.1 05/13/2019     Lab Results   Component Value Date    HCT 34.1 05/13/2019     Lab Results   Component Value Date    MCV 91 05/13/2019     Lab Results   Component Value Date    MCH 29.6 05/13/2019     Lab Results   Component Value Date    MCHC 32.6 05/13/2019     Lab Results   Component Value Date    RDW 16.6 05/13/2019     Lab Results   Component Value Date     05/13/2019     Last Comprehensive Metabolic Panel:  Sodium   Date Value Ref Range Status   05/16/2019 140 133 - 144 " mmol/L Final     Potassium   Date Value Ref Range Status   05/16/2019 4.1 3.4 - 5.3 mmol/L Final     Chloride   Date Value Ref Range Status   05/16/2019 103 94 - 109 mmol/L Final     Carbon Dioxide   Date Value Ref Range Status   05/16/2019 27 20 - 32 mmol/L Final     Anion Gap   Date Value Ref Range Status   05/16/2019 10 3 - 14 mmol/L Final     Glucose   Date Value Ref Range Status   05/16/2019 142 (H) 70 - 99 mg/dL Final     Urea Nitrogen   Date Value Ref Range Status   05/16/2019 52 (H) 7 - 30 mg/dL Final     Creatinine   Date Value Ref Range Status   05/16/2019 1.24 (H) 0.52 - 1.04 mg/dL Final     GFR Estimate   Date Value Ref Range Status   05/16/2019 37 (L) >60 mL/min/[1.73_m2] Final     Comment:     Non  GFR Calc  Starting 12/18/2018, serum creatinine based estimated GFR (eGFR) will be   calculated using the Chronic Kidney Disease Epidemiology Collaboration   (CKD-EPI) equation.       Calcium   Date Value Ref Range Status   05/16/2019 9.4 8.5 - 10.1 mg/dL Final       ASSESSMENT / PLAN:  (J69.0) Aspiration pneumonia of both lungs, unspecified aspiration pneumonia type, unspecified part of lung (H)  (primary encounter diagnosis)  (R13.10) Dysphagia, unspecified type  (J45.909) Uncomplicated asthma, unspecified asthma severity, unspecified whether persistent  Comment: respiratory status improving, remains on continuous O2.   Plan: continue doxycycline for 10 days total. Continue nebs, prednisone taper. Mucinex for 5 days. Wean O2 as tolerated to maintain sats >90%. Continue SPEECH THERAPY.     (I50.42) Chronic combined systolic and diastolic heart failure (H)  Comment: weight unchanged. Edema and dyspnea improved  Plan: continue lasix every other day. Daily weight.     (I48.2) Chronic atrial fibrillation (H)  (Z95.0) Pacemaker  Comment: rate controlled.   Plan: continue diltiazem, apixaban     (N18.3) CKD (chronic kidney disease) stage 3, GFR 30-59 ml/min (H)  Comment: renal function at  baseline  Plan: follow BMP. Avoid nephrotoxins.     (I10) Essential hypertension  Comment: acceptable control, given her advanced age and fall risk  Plan: continue diltiazem, lasix. Monitor VS    (F03.90) Dementia without behavioral disturbance, unspecified dementia type  Comment: moderate deficits. Cognition has improved since tcu admission  Plan: supportive care     (R53.81) Physical deconditioning  Comment: slowly progressing in therapies  Plan: continue PHYSICAL THERAPY/OT. Goal is to return to [facility] with services.       Electronically signed by:  JAKE Fraire CNP

## 2019-05-21 ENCOUNTER — HOSPITAL LABORATORY (OUTPATIENT)
Dept: OTHER | Facility: CLINIC | Age: 84
End: 2019-05-21

## 2019-05-21 LAB
ANION GAP SERPL CALCULATED.3IONS-SCNC: 9 MMOL/L (ref 3–14)
BUN SERPL-MCNC: 46 MG/DL (ref 7–30)
CALCIUM SERPL-MCNC: 8.7 MG/DL (ref 8.5–10.1)
CHLORIDE SERPL-SCNC: 102 MMOL/L (ref 94–109)
CO2 SERPL-SCNC: 28 MMOL/L (ref 20–32)
CREAT SERPL-MCNC: 1.21 MG/DL (ref 0.52–1.04)
GFR SERPL CREATININE-BSD FRML MDRD: 38 ML/MIN/{1.73_M2}
GLUCOSE SERPL-MCNC: 157 MG/DL (ref 70–99)
POTASSIUM SERPL-SCNC: 3.9 MMOL/L (ref 3.4–5.3)
SODIUM SERPL-SCNC: 139 MMOL/L (ref 133–144)

## 2019-05-22 ENCOUNTER — NURSING HOME VISIT (OUTPATIENT)
Dept: GERIATRICS | Facility: CLINIC | Age: 84
End: 2019-05-22
Payer: MEDICARE

## 2019-05-22 VITALS
WEIGHT: 157.8 LBS | OXYGEN SATURATION: 96 % | BODY MASS INDEX: 27.96 KG/M2 | SYSTOLIC BLOOD PRESSURE: 158 MMHG | HEIGHT: 63 IN | TEMPERATURE: 99 F | DIASTOLIC BLOOD PRESSURE: 72 MMHG | RESPIRATION RATE: 18 BRPM | HEART RATE: 64 BPM

## 2019-05-22 DIAGNOSIS — I10 ESSENTIAL HYPERTENSION: ICD-10-CM

## 2019-05-22 DIAGNOSIS — I50.42 CHRONIC COMBINED SYSTOLIC AND DIASTOLIC HEART FAILURE (H): ICD-10-CM

## 2019-05-22 DIAGNOSIS — J45.909 UNCOMPLICATED ASTHMA, UNSPECIFIED ASTHMA SEVERITY, UNSPECIFIED WHETHER PERSISTENT: ICD-10-CM

## 2019-05-22 DIAGNOSIS — Z95.0 PACEMAKER: ICD-10-CM

## 2019-05-22 DIAGNOSIS — J69.0 ASPIRATION PNEUMONIA OF BOTH LUNGS, UNSPECIFIED ASPIRATION PNEUMONIA TYPE, UNSPECIFIED PART OF LUNG (H): ICD-10-CM

## 2019-05-22 DIAGNOSIS — R53.81 PHYSICAL DECONDITIONING: ICD-10-CM

## 2019-05-22 DIAGNOSIS — N18.30 CKD (CHRONIC KIDNEY DISEASE) STAGE 3, GFR 30-59 ML/MIN (H): ICD-10-CM

## 2019-05-22 DIAGNOSIS — R07.9 CHEST PAIN, UNSPECIFIED TYPE: Primary | ICD-10-CM

## 2019-05-22 DIAGNOSIS — R13.10 DYSPHAGIA, UNSPECIFIED TYPE: ICD-10-CM

## 2019-05-22 DIAGNOSIS — I48.20 CHRONIC ATRIAL FIBRILLATION (H): ICD-10-CM

## 2019-05-22 DIAGNOSIS — F03.90 DEMENTIA WITHOUT BEHAVIORAL DISTURBANCE, UNSPECIFIED DEMENTIA TYPE: ICD-10-CM

## 2019-05-22 PROCEDURE — 99310 SBSQ NF CARE HIGH MDM 45: CPT | Performed by: NURSE PRACTITIONER

## 2019-05-22 ASSESSMENT — MIFFLIN-ST. JEOR: SCORE: 1084.91

## 2019-05-22 NOTE — PROGRESS NOTES
"[facility] [place] [facility]  [place] Medical Record Number:  [id number]  Place of Service where encounter took place:  [facility] [[id number]]  Chief Complaint   Patient presents with     RECHECK       HPI:    Swati Solis  is a 89 year old (12/10/1924), who is being seen today for an episodic care visit.  HPI information obtained from: facility chart records, facility staff, patient report, [facility] chart review and family/first contact granddaughter and daughter in law report.   She came to this facility 4/28/2019 for short term rehab and medical management following hospitalization with shortness of breath and hypoxia.   CXR showed left basilar atelectasis vs consolidation and opacity mid to upper right lung. She was treated with nebs, IV steroids, ceftriaxone and azithromycin with improvement. Discharged on prednisone taper and Ceftin for 5 days. Weaned off O2 prior to hospital discharge. She noted chest heaviness at rest. EKG with paced rhythm. ECHO: EF 50-55%, mid anteroseptal hypokinesis. Further work up was declined by family. Rivaroxaban was changed to apixaban and lisinopril was discontinued due to her CKD.     Today's concern is:     Chest pain, unspecified type- she is seen this am with chest \"pain and pressure\" across her chest. Chest feels \"tight.\" Denies shortness of breath or cough. No dizziness. Pain does not radiate. Very fatigued. Poor appetite.   She was given nitrostat X 1 with improvement. SBP was 170, down to 112 after nitrostat.   Chronic combined systolic and diastolic heart failure (H)-lasix was started every other day on 5/9/2019 and increased to daily on 5/172019 for increased edema, dyspnea. Weight is up 7 lbs to 157. Has  Chronic atrial fibrillation (H)-HR: 64-67  Pacemaker  Dysphagia, unspecified type  Aspiration pneumonia of both lungs, unspecified aspiration pneumonia type, unspecified part of lung (H)-she had a coughing episode while drinking water " 5/9/2019 and CXR showed multifocal pneumonia with chronic bronchitis/emphysema. Doxycycline, prednisone and nebs were started. Remains on continuous O2.   Uncomplicated asthma, unspecified asthma severity, unspecified whether persistent  CKD (chronic kidney disease) stage 3, GFR 30-59 ml/min (H)  Essential hypertension-BPs: 158/72, 159/74, 147/79  Dementia without behavioral disturbance, unspecified dementia type-pleasantly confused. SLUMS 5/30, CPT 4.3/5.6    Physical deconditioning-unable to participate in therapies today. Has been making minimal progress. Requires max assist with cares.     Her granddaughter Ann is here and I spoke with her daughter in law/POA Ruth Solis who is in [place]. Patient with progressive decline in both functional status and cognition,  more significant decline since recently moving  from [place] to [place]. She's had multiple hospitalizations and now has recurrent aspiration pneumonia. Patient has requested minimal medical intervention in the past and today, firmly states that she does not want to go back to the hospital. Family members wish for comfort care.     Past Medical and Surgical History reviewed in Epic today.    MEDICATIONS:  Current Outpatient Medications   Medication Sig Dispense Refill     acetaminophen (TYLENOL) 325 MG tablet Take 650 mg by mouth every 4 hours as needed for mild pain       albuterol (PROAIR HFA/PROVENTIL HFA/VENTOLIN HFA) 108 (90 Base) MCG/ACT inhaler Inhale 2 puffs into the lungs every 2 hours as needed for shortness of breath / dyspnea or wheezing       allopurinol (ZYLOPRIM) 100 MG tablet Take 100 mg by mouth daily 0730       apixaban ANTICOAGULANT (ELIQUIS) 2.5 MG tablet Take 1 tablet (2.5 mg) by mouth 2 times daily 60 tablet 1     diltiazem ER (DILT-XR) 120 MG 24 hr capsule Take 120 mg by mouth daily 0800       escitalopram (LEXAPRO) 5 MG tablet Take 5 mg by mouth daily 2000       fluticasone (FLOVENT DISKUS) 50 MCG/BLIST inhaler Inhale 1 puff into the  "lungs every 12 hours 0800, 2000       furosemide (LASIX) 20 MG tablet Take 20 mg by mouth every other day       ipratropium - albuterol 0.5 mg/2.5 mg/3 mL (DUONEB) 0.5-2.5 (3) MG/3ML neb solution Take 1 vial by nebulization 4 times daily       latanoprost (XALATAN) 0.005 % ophthalmic solution Place 1 drop into both eyes At Bedtime 2000       lovastatin (MEVACOR) 20 MG tablet Take 20 mg by mouth every evening 1500       potassium chloride ER (K-DUR/KLOR-CON M) 10 MEQ CR tablet Take 20 mEq by mouth every other day Take with lasix       senna-docusate (SENOKOT-S/PERICOLACE) 8.6-50 MG tablet Take 1 tablet by mouth 2 times daily as needed for constipation       predniSONE (DELTASONE) 1 MG tablet Take by mouth daily 40 mg daily x 3, 20 mg daily x 3, 10 mg daily x 3, 5 mg daily x 3 then dc .         REVIEW OF SYSTEMS:  4 point ROS including Respiratory, CV, GI and , other than that noted in the HPI,  is negative    Objective:  /72   Pulse 64   Temp 99  F (37.2  C)   Resp 18   Ht 1.6 m (5' 3\")   Wt 71.6 kg (157 lb 12.8 oz)   SpO2 96%   BMI 27.95 kg/m    Exam:  GENERAL APPEARANCE:  sleepy, in no acute distress, frail   ENT:  Mouth and posterior oropharynx normal, dry  mucous membranes, [place]  EYES:  EOM normal, conjunctiva and lids normal, PERRL  NECK:  No adenopathy,masses or thyromegaly  RESP:  respiratory effort and palpation of chest normal, no respiratory distress, diminished breath sounds bilaterally, no crackles or wheezes  CV:  Palpation and auscultation of heart done , regular rate and rhythm, no murmur, 1+  edema  ABDOMEN:  soft, nontender, no hepatosplenomegaly or other masses  M/S:   wheelchair. ALONSO with good strength. No joint inflammation  SKIN:  no rashes or open areas  PSYCH:  oriented to self, family, situation, insight and judgement impaired, memory impaired     Labs:   Last Comprehensive Metabolic Panel:  Sodium   Date Value Ref Range Status   05/21/2019 139 133 - 144 mmol/L Final "     Potassium   Date Value Ref Range Status   05/21/2019 3.9 3.4 - 5.3 mmol/L Final     Chloride   Date Value Ref Range Status   05/21/2019 102 94 - 109 mmol/L Final     Carbon Dioxide   Date Value Ref Range Status   05/21/2019 28 20 - 32 mmol/L Final     Anion Gap   Date Value Ref Range Status   05/21/2019 9 3 - 14 mmol/L Final     Glucose   Date Value Ref Range Status   05/21/2019 157 (H) 70 - 99 mg/dL Final     Urea Nitrogen   Date Value Ref Range Status   05/21/2019 46 (H) 7 - 30 mg/dL Final     Creatinine   Date Value Ref Range Status   05/21/2019 1.21 (H) 0.52 - 1.04 mg/dL Final     GFR Estimate   Date Value Ref Range Status   05/21/2019 38 (L) >60 mL/min/[1.73_m2] Final     Comment:     Non  GFR Calc  Starting 12/18/2018, serum creatinine based estimated GFR (eGFR) will be   calculated using the Chronic Kidney Disease Epidemiology Collaboration   (CKD-EPI) equation.       Calcium   Date Value Ref Range Status   05/21/2019 8.7 8.5 - 10.1 mg/dL Final       ASSESSMENT / PLAN:  (R07.9) Chest pain, unspecified type  (primary encounter diagnosis)  (I50.42) Chronic combined systolic and diastolic heart failure (H)  Comment: checked on her frequently throughout the day and she was resting comfortably with no recurrent episodes of chest pain. Increased LE edema and weight is up. Diuresis is limited by renal function. Family has a good understanding of her situation and wish for comfort care. They are hoping she will be able to [facility] with [facility] care.   Plan: refer to [facility]. No hospitalizations unless requested by family. Continue lasix. Nitrostat prn angina.   Discussed with staff.     (I48.2) Chronic atrial fibrillation (H)  (Z95.0) Pacemaker  Comment: rate controlled  Plan: continue diltiazem.     (R13.10) Dysphagia, unspecified type  (J69.0) Aspiration pneumonia of both lungs, unspecified aspiration pneumonia type, unspecified part of lung (H)  (J45.909)  Uncomplicated asthma, unspecified asthma severity, unspecified whether persistent  Comment: recurrent aspiration. Respiratory status stable today. Has completed doxycycline and prednisone taper.   Plan: continuous O2. Continue nebs, prn albuterol.     (N18.3) CKD (chronic kidney disease) stage 3, GFR 30-59 ml/min (H)  Comment: renal function  Plan: daughter in law would like BMP checked again this week.  Avoid nephrotoxins.     (I10) Essential hypertension  Comment: acceptable control  Plan: continue diltiazem, lasix. Monitor VS    (F03.90) Dementia without behavioral disturbance, unspecified dementia type  Comment: moderate to severe deficits  Plan: supportive care. Continue Lexapro for mood.     (R53.81) Physical deconditioning  Comment: minimal progress in therapies  Plan: continue PHYSICAL THERAPY/OT as able.         Total time spent with patient visit at the skilled nursing facility was 50 mins including patient visit, review of past records and phone call to patient contact. Greater than 50% of total time spent with counseling and coordinating care due to complexity of care  Electronically signed by:  JAKE Fraire CNP

## 2019-05-22 NOTE — LETTER
"    5/22/2019        RE: Swati Solis  [address]  [place] [address]        [place] [facility]  [place] Medical Record Number:  [id number]  [facility] where encounter took place[facility] [[id number]]  Chief Complaint   Patient presents with     RECHECK       HPI:    Swati Solis  is a 89 year old (12/10/1924), who is being seen today for an episodic care visit.  HPI information obtained from: facility chart records, facility staff, patient report, [facility] chart review and family/first contact granddaughter and daughter in law report.   She came to this facility 4/28/2019 for short term rehab and medical management following hospitalization with shortness of breath and hypoxia.   CXR showed left basilar atelectasis vs consolidation and opacity mid to upper right lung. She was treated with nebs, IV steroids, ceftriaxone and azithromycin with improvement. Discharged on prednisone taper and Ceftin for 5 days. Weaned off O2 prior to hospital discharge. She noted chest heaviness at rest. EKG with paced rhythm. ECHO: EF 50-55%, mid anteroseptal hypokinesis. Further work up was declined by family. Rivaroxaban was changed to apixaban and lisinopril was discontinued due to her CKD.     Today's concern is:     Chest pain, unspecified type- she is seen this am with chest \"pain and pressure\" across her chest. Chest feels \"tight.\" Denies shortness of breath or cough. No dizziness. Pain does not radiate. Very fatigued. Poor appetite.   She was given nitrostat X 1 with improvement. SBP was 170, down to 112 after nitrostat.   Chronic combined systolic and diastolic heart failure (H)-lasix was started every other day on 5/9/2019 and increased to daily on 5/172019 for increased edema, dyspnea. Weight is up 7 lbs to 157. Has  Chronic atrial fibrillation (H)-HR: 64-67  Pacemaker  Dysphagia, unspecified type  Aspiration pneumonia of both lungs, unspecified aspiration " pneumonia type, unspecified part of lung (H)-she had a coughing episode while drinking water 5/9/2019 and CXR showed multifocal pneumonia with chronic bronchitis/emphysema. Doxycycline, prednisone and nebs were started. Remains on continuous O2.   Uncomplicated asthma, unspecified asthma severity, unspecified whether persistent  CKD (chronic kidney disease) stage 3, GFR 30-59 ml/min (H)  Essential hypertension-BPs: 158/72, 159/74, 147/79  Dementia without behavioral disturbance, unspecified dementia type-pleasantly confused. SLUMS 5/30, CPT 4.3/5.6    Physical deconditioning-unable to participate in therapies today. Has been making minimal progress. Requires max assist with cares.     Her granddaughter Ann is here and I spoke with her daughter in law/POA Ruth Solis who is in [place]. Patient with progressive decline in both functional status and cognition,  more significant decline since recently moving  from [place] to [place]. She's had multiple hospitalizations and now has recurrent aspiration pneumonia. Patient has requested minimal medical intervention in the past and today, firmly states that she does not want to go back to the hospital. Family members wish for comfort care.     Past Medical and Surgical History reviewed in Epic today.    MEDICATIONS:  Current Outpatient Medications   Medication Sig Dispense Refill     acetaminophen (TYLENOL) 325 MG tablet Take 650 mg by mouth every 4 hours as needed for mild pain       albuterol (PROAIR HFA/PROVENTIL HFA/VENTOLIN HFA) 108 (90 Base) MCG/ACT inhaler Inhale 2 puffs into the lungs every 2 hours as needed for shortness of breath / dyspnea or wheezing       allopurinol (ZYLOPRIM) 100 MG tablet Take 100 mg by mouth daily 0730       apixaban ANTICOAGULANT (ELIQUIS) 2.5 MG tablet Take 1 tablet (2.5 mg) by mouth 2 times daily 60 tablet 1     diltiazem ER (DILT-XR) 120 MG 24 hr capsule Take 120 mg by mouth daily 0800       escitalopram (LEXAPRO) 5 MG tablet Take 5 mg by mouth  "daily 2000       fluticasone (FLOVENT DISKUS) 50 MCG/BLIST inhaler Inhale 1 puff into the lungs every 12 hours 0800, 2000       furosemide (LASIX) 20 MG tablet Take 20 mg by mouth every other day       ipratropium - albuterol 0.5 mg/2.5 mg/3 mL (DUONEB) 0.5-2.5 (3) MG/3ML neb solution Take 1 vial by nebulization 4 times daily       latanoprost (XALATAN) 0.005 % ophthalmic solution Place 1 drop into both eyes At Bedtime 2000       lovastatin (MEVACOR) 20 MG tablet Take 20 mg by mouth every evening 1500       potassium chloride ER (K-DUR/KLOR-CON M) 10 MEQ CR tablet Take 20 mEq by mouth every other day Take with lasix       senna-docusate (SENOKOT-S/PERICOLACE) 8.6-50 MG tablet Take 1 tablet by mouth 2 times daily as needed for constipation       predniSONE (DELTASONE) 1 MG tablet Take by mouth daily 40 mg daily x 3, 20 mg daily x 3, 10 mg daily x 3, 5 mg daily x 3 then dc .         REVIEW OF SYSTEMS:  4 point ROS including Respiratory, CV, GI and , other than that noted in the HPI,  is negative    Objective:  /72   Pulse 64   Temp 99  F (37.2  C)   Resp 18   Ht 1.6 m (5' 3\")   Wt 71.6 kg (157 lb 12.8 oz)   SpO2 96%   BMI 27.95 kg/m     Exam:  GENERAL APPEARANCE:   sleepy, in no acute distress, frail   ENT:  Mouth and posterior oropharynx normal, dry  mucous membranes, [place]  EYES:  EOM normal, conjunctiva and lids normal, PERRL  NECK:  No adenopathy,masses or thyromegaly  RESP:  respiratory effort and palpation of chest normal, no respiratory distress, diminished breath sounds bilaterally, no crackles or wheezes  CV:  Palpation and auscultation of heart done , regular rate and rhythm, no murmur, 1+  edema  ABDOMEN:  soft, nontender, no hepatosplenomegaly or other masses  M/S:    wheelchair. ALONSO with good strength. No joint inflammation  SKIN:  no rashes or open areas  PSYCH:  oriented to self,  family, situation, insight and judgement impaired, memory impaired     Labs:   Last Comprehensive Metabolic " Panel:  Sodium   Date Value Ref Range Status   05/21/2019 139 133 - 144 mmol/L Final     Potassium   Date Value Ref Range Status   05/21/2019 3.9 3.4 - 5.3 mmol/L Final     Chloride   Date Value Ref Range Status   05/21/2019 102 94 - 109 mmol/L Final     Carbon Dioxide   Date Value Ref Range Status   05/21/2019 28 20 - 32 mmol/L Final     Anion Gap   Date Value Ref Range Status   05/21/2019 9 3 - 14 mmol/L Final     Glucose   Date Value Ref Range Status   05/21/2019 157 (H) 70 - 99 mg/dL Final     Urea Nitrogen   Date Value Ref Range Status   05/21/2019 46 (H) 7 - 30 mg/dL Final     Creatinine   Date Value Ref Range Status   05/21/2019 1.21 (H) 0.52 - 1.04 mg/dL Final     GFR Estimate   Date Value Ref Range Status   05/21/2019 38 (L) >60 mL/min/[1.73_m2] Final     Comment:     Non  GFR Calc  Starting 12/18/2018, serum creatinine based estimated GFR (eGFR) will be   calculated using the Chronic Kidney Disease Epidemiology Collaboration   (CKD-EPI) equation.       Calcium   Date Value Ref Range Status   05/21/2019 8.7 8.5 - 10.1 mg/dL Final       ASSESSMENT / PLAN:  (R07.9) Chest pain, unspecified type  (primary encounter diagnosis)  (I50.42) Chronic combined systolic and diastolic heart failure (H)  Comment: checked on her frequently throughout the day and she was resting comfortably with no recurrent episodes of chest pain. Increased LE edema and weight is up. Diuresis is limited by renal function. Family has a good understanding of her situation and wish for comfort care. They are hoping she will be able to return [facility] with hosp[facility] care.   Plan: refer to [facility]. No hospitalizations unless requested by family. Continue lasix. Nitrostat prn angina.   Discussed with staff.     (I48.2) Chronic atrial fibrillation (H)  (Z95.0) Pacemaker  Comment: rate controlled  Plan: continue diltiazem.     (R13.10) Dysphagia, unspecified type  (J69.0) Aspiration pneumonia of both lungs,  unspecified aspiration pneumonia type, unspecified part of lung (H)  (J45.909) Uncomplicated asthma, unspecified asthma severity, unspecified whether persistent  Comment: recurrent aspiration. Respiratory status stable today. Has completed doxycycline and prednisone taper.   Plan: continuous O2. Continue nebs, prn albuterol.     (N18.3) CKD (chronic kidney disease) stage 3, GFR 30-59 ml/min (H)  Comment: renal function  Plan: daughter in law would like BMP checked again this week.  Avoid nephrotoxins.     (I10) Essential hypertension  Comment: acceptable control  Plan: continue diltiazem, lasix. Monitor VS    (F03.90) Dementia without behavioral disturbance, unspecified dementia type  Comment: moderate to severe deficits  Plan: supportive care. Continue Lexapro for mood.     (R53.81) Physical deconditioning  Comment: minimal progress in therapies  Plan: continue PHYSICAL THERAPY/OT as able.         Total time spent with patient visit at the skilled nursing facility was 50 mins including patient visit, review of past records and phone call to patient contact. Greater than 50% of total time spent with counseling and coordinating care due to complexity of care  Electronically signed by:  JAKE Fraire CNP               Documentation of Face-to-Face and Certification for Home Health Services     Patient: Swati Solis   YOB: 1924  MR Number: [id number]  Today's Date: 5/22/2019    I certify that patient: Swati Solis is under my care and that I, or a nurse practitioner or physician's assistant working with me, had a face-to-face encounter that meets the physician face-to-face encounter requirements with this patient on: 5/22/2019.    This encounter with the patient was in whole, or in part, for the following medical condition, which is the primary reason for home health care: CHF, recurrent aspiration pneumonia, COPD.    I certify that, based on my findings, the following services are  medically necessary home health services: Hospice care.    My clinical findings support the need for the above services because: Nurse is needed: end of life care.    Further, I certify that my clinical findings support that this patient is homebound (i.e. absences from home require considerable and taxing effort and are for medical reasons or [employer] services or infrequently or of short duration when for other reasons) because: Requires assistance of another person or specialized equipment to access medical services because patient: Is prone to wander/get lost without assistance., Is unable to exit home safely on own due to: gait instability, requires assist with all cares., Is unable to operate assistive equipment on their own. and Requires supervision of another for safe transfer...    Based on the above findings. I certify that this patient is confined to the home and needs intermittent skilled nursing care, physical therapy and/or speech therapy.  The patient is under my care, and I have initiated the establishment of the plan of care.  This patient will be followed by a physician who will periodically review the plan of care.  Physician/Provider to provide follow up care: Hanna Samuels    Responsible Medicare certified PECOS Physician: Dr.Emily Meño MD, signing F2F and only signing for initial order. Please send all follow up questions and concerns or needed follow up signatures to the PCP, who [facility] has on file as:  Hanna Samuels.    Physician Signature: See electronic signature associated with these discharge orders.  Date: 5/22/2019        Sincerely,        JAKE Fraire CNP

## 2019-05-22 NOTE — PROGRESS NOTES
Documentation of Face-to-Face and Certification for Home Health Services     Patient: Swati Solis   YOB: 1924  MR Number: [id number]  Today's Date: 5/22/2019    I certify that patient: Swati Solis is under my care and that I, or a nurse practitioner or physician's assistant working with me, had a face-to-face encounter that meets the physician face-to-face encounter requirements with this patient on: 5/22/2019.    This encounter with the patient was in whole, or in part, for the following medical condition, which is the primary reason for home health care: CHF, recurrent aspiration pneumonia, COPD.    I certify that, based on my findings, the following services are medically necessary home health services: Hospice care.    My clinical findings support the need for the above services because: Nurse is needed: end of life care.    Further, I certify that my clinical findings support that this patient is homebound (i.e. absences from home require considerable and taxing effort and are for medical reasons or [employer] services or infrequently or of short duration when for other reasons) because: Requires assistance of another person or specialized equipment to access medical services because patient: Is prone to wander/get lost without assistance., Is unable to exit home safely on own due to: gait instability, requires assist with all cares., Is unable to operate assistive equipment on their own. and Requires supervision of another for safe transfer...    Based on the above findings. I certify that this patient is confined to the home and needs intermittent skilled nursing care, physical therapy and/or speech therapy.  The patient is under my care, and I have initiated the establishment of the plan of care.  This patient will be followed by a physician who will periodically review the plan of care.  Physician/Provider to provide follow up care: Hanna Samuels    Responsible Medicare  certified P[employer] Physician:.Tara Wilder DO, signing F2F and only signing for initial order. Please send all follow up questions and concerns or needed follow up signatures to the PCP, who [facility] has on file as:  Hanna Samuels.    Physician Signature: See electronic signature associated with these discharge orders.  Date: 5/22/2019    ADDENDUM:  I agree with home care orders.  Tara Wilder DO

## 2019-05-24 ENCOUNTER — HOSPITAL LABORATORY (OUTPATIENT)
Dept: OTHER | Facility: CLINIC | Age: 84
End: 2019-05-24

## 2019-05-24 LAB
ANION GAP SERPL CALCULATED.3IONS-SCNC: 3 MMOL/L (ref 3–14)
BUN SERPL-MCNC: 40 MG/DL (ref 7–30)
CALCIUM SERPL-MCNC: 8.4 MG/DL (ref 8.5–10.1)
CHLORIDE SERPL-SCNC: 105 MMOL/L (ref 94–109)
CO2 SERPL-SCNC: 32 MMOL/L (ref 20–32)
CREAT SERPL-MCNC: 1.25 MG/DL (ref 0.52–1.04)
GFR SERPL CREATININE-BSD FRML MDRD: 37 ML/MIN/{1.73_M2}
GLUCOSE SERPL-MCNC: 96 MG/DL (ref 70–99)
POTASSIUM SERPL-SCNC: 3.8 MMOL/L (ref 3.4–5.3)
SODIUM SERPL-SCNC: 140 MMOL/L (ref 133–144)

## 2019-05-28 ENCOUNTER — DISCHARGE SUMMARY NURSING HOME (OUTPATIENT)
Dept: GERIATRICS | Facility: CLINIC | Age: 84
End: 2019-05-28
Payer: MEDICARE

## 2019-05-28 VITALS
WEIGHT: 155.1 LBS | RESPIRATION RATE: 14 BRPM | BODY MASS INDEX: 27.48 KG/M2 | TEMPERATURE: 99.9 F | SYSTOLIC BLOOD PRESSURE: 170 MMHG | HEIGHT: 63 IN | DIASTOLIC BLOOD PRESSURE: 77 MMHG | OXYGEN SATURATION: 95 % | HEART RATE: 65 BPM

## 2019-05-28 DIAGNOSIS — I50.42 CHRONIC COMBINED SYSTOLIC AND DIASTOLIC HEART FAILURE (H): ICD-10-CM

## 2019-05-28 DIAGNOSIS — R13.10 DYSPHAGIA, UNSPECIFIED TYPE: ICD-10-CM

## 2019-05-28 DIAGNOSIS — N18.30 CKD (CHRONIC KIDNEY DISEASE) STAGE 3, GFR 30-59 ML/MIN (H): ICD-10-CM

## 2019-05-28 DIAGNOSIS — J45.909 UNCOMPLICATED ASTHMA, UNSPECIFIED ASTHMA SEVERITY, UNSPECIFIED WHETHER PERSISTENT: ICD-10-CM

## 2019-05-28 DIAGNOSIS — F03.90 DEMENTIA WITHOUT BEHAVIORAL DISTURBANCE, UNSPECIFIED DEMENTIA TYPE: ICD-10-CM

## 2019-05-28 DIAGNOSIS — J69.0 ASPIRATION PNEUMONIA OF BOTH LUNGS, UNSPECIFIED ASPIRATION PNEUMONIA TYPE, UNSPECIFIED PART OF LUNG (H): Primary | ICD-10-CM

## 2019-05-28 DIAGNOSIS — I48.20 CHRONIC ATRIAL FIBRILLATION (H): ICD-10-CM

## 2019-05-28 DIAGNOSIS — I10 ESSENTIAL HYPERTENSION: ICD-10-CM

## 2019-05-28 DIAGNOSIS — R53.81 PHYSICAL DECONDITIONING: ICD-10-CM

## 2019-05-28 DIAGNOSIS — Z95.0 PACEMAKER: ICD-10-CM

## 2019-05-28 PROCEDURE — 99316 NF DSCHRG MGMT 30 MIN+: CPT | Performed by: NURSE PRACTITIONER

## 2019-05-28 RX ORDER — NITROGLYCERIN 0.4 MG/1
0.4 TABLET SUBLINGUAL EVERY 5 MIN PRN
COMMUNITY

## 2019-05-28 ASSESSMENT — MIFFLIN-ST. JEOR: SCORE: 1072.66

## 2019-05-28 NOTE — PROGRESS NOTES
[facility] DISCHARGE SUMMARY  PATIENT'S NAME: Swati Solis  YOB: 1924  MEDICAL RECORD NUMBER:  [id number]  [facility] of [facility] where encounter took place[facility] [[id number]]    PRIMARY CARE PROVIDER AND CLINIC RESPONSIBLE AFTER TRANSFER:   Hanna Samuels MD, [facility] [address] [place] [address]  / [facility] *    [facility] with hospice services through [place]     Transferring providers: JAKE Fraire CNP, Tara Wilder MD  Recent Hospitalization/ED:  [facility] Hospital stay 4/24/2019 to 4/28/2019.  Date of SNF Admission: April / 28 / 2019  Date of SNF (anticipated) Discharge: May / 28 / 2019  Discharged to: previous assisted living  Cognitive Scores: SLUMS: 5/30 and CPT: 4.3/5.6  DME: Wheelchair and Walker    CODE STATUS/ADVANCE DIRECTIVES DISCUSSION:  DNR / DNI   ALLERGIES: Amoxicillin    DISCHARGE DIAGNOSIS/NURSING FACILITY COURSE:   She came to this facility 4/28/2019 for short term rehab and medical management following hospitalization with shortness of breath and hypoxia.   CXR showed left basilar atelectasis vs consolidation and opacity mid to upper right lung. She was treated with nebs, IV steroids, ceftriaxone and azithromycin with improvement. Weaned off O2 prior to hospital discharge. She noted chest heaviness at rest. EKG with paced rhythm. ECHO: EF 50-55%, mid anteroseptal hypokinesis. Further work up was declined by family. While inpatient, rivaroxaban was changed to apixaban and lisinopril was discontinued due to her CKD. Discharged from the hospital on prednisone taper and Ceftin for 5 days.       She has worked with PHYSICAL THERAPY, OT and SPEECH THERAPY with minimal progress, and her overall status has declined in the past 1-2 weeks.  Wheelchair bound and requires assist with all cares.   She had a coughing episode while drinking water 5/9/2019 and CXR showed multifocal  pneumonia with chronic bronchitis/emphysema. Doxycycline, prednisone and nebs were started. Remains on continuous O2. Lasix was resumed for volume overload, however diureses is limited by her CKD.   Due to her poor progress in therapies and declining status, family requested comfort care with no further hospitalizations. She has enrolled with [facility] and will be returning to her [place] for end of life care.     Past Medical History:  has a past medical history of Asthma, Atrial fibrillation (H), Chronic kidney disease, CKD (chronic kidney disease) stage 3, GFR 30-59 ml/min (H), Depressive disorder, Gout, Heart failure (H), Hypertension, Pacemaker, PE (pulmonary thromboembolism) (H), and Preglaucoma.    Discharge Medications:  Current Outpatient Medications   Medication Sig Dispense Refill     acetaminophen (TYLENOL) 325 MG tablet Take 650 mg by mouth every 4 hours as needed for mild pain       albuterol (PROAIR HFA/PROVENTIL HFA/VENTOLIN HFA) 108 (90 Base) MCG/ACT inhaler Inhale 2 puffs into the lungs every 2 hours as needed for shortness of breath / dyspnea or wheezing       allopurinol (ZYLOPRIM) 100 MG tablet Take 100 mg by mouth daily 0730       apixaban ANTICOAGULANT (ELIQUIS) 2.5 MG tablet Take 1 tablet (2.5 mg) by mouth 2 times daily 60 tablet 1     diltiazem ER (DILT-XR) 120 MG 24 hr capsule Take 120 mg by mouth daily 0800       escitalopram (LEXAPRO) 5 MG tablet Take 5 mg by mouth daily 2000       fluticasone (FLOVENT DISKUS) 50 MCG/BLIST inhaler Inhale 1 puff into the lungs every 12 hours 0800, 2000       furosemide (LASIX) 20 MG tablet Take 20 mg by mouth every other day       ipratropium - albuterol 0.5 mg/2.5 mg/3 mL (DUONEB) 0.5-2.5 (3) MG/3ML neb solution Take 1 vial by nebulization 4 times daily       latanoprost (XALATAN) 0.005 % ophthalmic solution Place 1 drop into both eyes At Bedtime 2000       lovastatin (MEVACOR) 20 MG tablet Take 20 mg by mouth every evening 1500       nitroGLYcerin  "(NITROSTAT) 0.4 MG sublingual tablet Place 0.4 mg under the tongue every 5 minutes as needed for chest pain For chest pain place 1 tablet under the tongue every 5 minutes for 3 doses. If symptoms persist 5 minutes after 1st dose call 911.       potassium chloride ER (K-DUR/KLOR-CON M) 10 MEQ CR tablet Take 20 mEq by mouth every other day Take with lasix       senna-docusate (SENOKOT-S/PERICOLACE) 8.6-50 MG tablet Take 1 tablet by mouth 2 times daily as needed for constipation         Medication Changes/Rationale:     Ceftin completed 5/3/2019    Prednisone taper completed 5/9/2019    Doxycycline for recurrent aspiration pneumonia 5/9-5/16/2019    Prednisone with taper 5/9-5/21/2019 for recurrent pneumonia    Lasix and KCl resumed 5/9/2019    Nitrostat prn chest pain    Controlled medications sent with patient:   not applicable/none     ROS:   4 point ROS including Respiratory, CV, GI and , other than that noted in the HPI,  is negative    Physical Exam:   Vitals: /77   Pulse 65   Temp 99.9  F (37.7  C)   Resp 14   Ht 1.6 m (5' 3\")   Wt 70.4 kg (155 lb 1.6 oz)   SpO2 95%   BMI 27.47 kg/m    BMI= Body mass index is 27.47 kg/m .   GENERAL APPEARANCE:  alert,  in no acute distress, frail   ENT:  Mouth and posterior oropharynx normal, dry  mucous membranes, [place]  EYES:  EOM normal, conjunctiva and lids normal, PERRL  NECK:  No adenopathy,masses or thyromegaly  RESP:  respiratory effort and palpation of chest normal, no respiratory distress, diminished breath sounds bilaterally, few crackles   CV:  Palpation and auscultation of heart done , regular rate and rhythm, no murmur, 1+  edema  ABDOMEN:  soft, nontender, no hepatosplenomegaly or other masses  M/S:   wheelchair. ALONSO with good strength. No joint inflammation  SKIN:  no rashes or open areas  PSYCH:  oriented to self,situation, insight and judgement impaired, memory impaired     SNF labs:  Lab Results   Component Value Date    WBC 10.0 05/13/2019 "     Lab Results   Component Value Date    RBC 3.75 05/13/2019     Lab Results   Component Value Date    HGB 11.1 05/13/2019     Lab Results   Component Value Date    HCT 34.1 05/13/2019     Lab Results   Component Value Date    MCV 91 05/13/2019     Lab Results   Component Value Date    MCH 29.6 05/13/2019     Lab Results   Component Value Date    MCHC 32.6 05/13/2019     Lab Results   Component Value Date    RDW 16.6 05/13/2019     Lab Results   Component Value Date     05/13/2019     Last Comprehensive Metabolic Panel:  Sodium   Date Value Ref Range Status   05/24/2019 140 133 - 144 mmol/L Final     Potassium   Date Value Ref Range Status   05/24/2019 3.8 3.4 - 5.3 mmol/L Final     Chloride   Date Value Ref Range Status   05/24/2019 105 94 - 109 mmol/L Final     Carbon Dioxide   Date Value Ref Range Status   05/24/2019 32 20 - 32 mmol/L Final     Anion Gap   Date Value Ref Range Status   05/24/2019 3 3 - 14 mmol/L Final     Glucose   Date Value Ref Range Status   05/24/2019 96 70 - 99 mg/dL Final     Urea Nitrogen   Date Value Ref Range Status   05/24/2019 40 (H) 7 - 30 mg/dL Final     Creatinine   Date Value Ref Range Status   05/24/2019 1.25 (H) 0.52 - 1.04 mg/dL Final     GFR Estimate   Date Value Ref Range Status   05/24/2019 37 (L) >60 mL/min/[1.73_m2] Final     Comment:     Non  GFR Calc  Starting 12/18/2018, serum creatinine based estimated GFR (eGFR) will be   calculated using the Chronic Kidney Disease Epidemiology Collaboration   (CKD-EPI) equation.       Calcium   Date Value Ref Range Status   05/24/2019 8.4 (L) 8.5 - 10.1 mg/dL Final       DISCHARGE PLAN:    Follow up labs: None     Medical Follow Up:      Follow up with hospice care    MTM referral needed and placed by this provider: No     Discharge Services: Home Care:  Hospice and From:  hospice services through [facility]      TOTAL DISCHARGE TIME:   Greater than 30 minutes  Electronically signed by:  Uli Hui,  APRN CNP

## 2019-05-28 NOTE — LETTER
5/28/2019        RE: Swati Solis  [address]  [place] [address]        [facility] DISCHARGE SUMMARY  PATIENT'S NAME: Swati Solis  YOB: 1924  MEDICAL RECORD NUMBER:  [id number]  [facility] where encounter took place[facility] [[id number]]    PRIMARY CARE PROVIDER AND CLINIC RESPONSIBLE AFTER TRANSFER:   Hanna Samuels MD, [facility] [address] [place] [address]  / [facility] *    [facility] with hospice services through [place]     Transferring providers: JAKE Fraire CNP, Tara Wilder MD  Recent Hospitalization/ED:  [facility] Hospital stay 4/24/2019 to 4/28/2019.  Date of SNF Admission: April / 28 / 2019  Date of SNF (anticipated) Discharge: May / 28 / 2019  Discharged to: previous assisted living  Cognitive Scores: SLUMS: 5/30 and CPT: 4.3/5.6  DME: Wheelchair and Walker    CODE STATUS/ADVANCE DIRECTIVES DISCUSSION:  DNR / DNI   ALLERGIES: Amoxicillin    DISCHARGE DIAGNOSIS/NURSING FACILITY COURSE:   She came to this facility 4/28/2019 for short term rehab and medical management following hospitalization with shortness of breath and hypoxia.   CXR showed left basilar atelectasis vs consolidation and opacity mid to upper right lung. She was treated with nebs, IV steroids, ceftriaxone and azithromycin with improvement. Weaned off O2 prior to hospital discharge. She noted chest heaviness at rest. EKG with paced rhythm. ECHO: EF 50-55%, mid anteroseptal hypokinesis. Further work up was declined by family. While inpatient, rivaroxaban was changed to apixaban and lisinopril was discontinued due to her CKD. Discharged  from the hospital on prednisone taper and Ceftin for 5 days.       She has worked with PHYSICAL THERAPY, OT and SPEECH THERAPY with minimal progress, and her overall status has declined in the past 1-2 weeks.  Wheelchair bound and requires assist with all cares.    She had a coughing episode while drinking water 5/9/2019 and CXR showed multifocal pneumonia with chronic bronchitis/emphysema. Doxycycline, prednisone and nebs were started. Remains on continuous O2. Lasix was resumed for volume overload, however diureses is limited by her CKD.   Due to her poor progress in therapies and declining status, family requested comfort care with no further hospitalizations. She has enrolled with [facility] and will be returning to her AL for end of life care.     Past Medical History:  has a past medical history of Asthma, Atrial fibrillation (H), Chronic kidney disease, CKD (chronic kidney disease) stage 3, GFR 30-59 ml/min (H), Depressive disorder, Gout, Heart failure (H), Hypertension, Pacemaker, PE (pulmonary thromboembolism) (H), and Preglaucoma.    Discharge Medications:  Current Outpatient Medications   Medication Sig Dispense Refill     acetaminophen (TYLENOL) 325 MG tablet Take 650 mg by mouth every 4 hours as needed for mild pain       albuterol (PROAIR HFA/PROVENTIL HFA/VENTOLIN HFA) 108 (90 Base) MCG/ACT inhaler Inhale 2 puffs into the lungs every 2 hours as needed for shortness of breath / dyspnea or wheezing       allopurinol (ZYLOPRIM) 100 MG tablet Take 100 mg by mouth daily 0730       apixaban ANTICOAGULANT (ELIQUIS) 2.5 MG tablet Take 1 tablet (2.5 mg) by mouth 2 times daily 60 tablet 1     diltiazem ER (DILT-XR) 120 MG 24 hr capsule Take 120 mg by mouth daily 0800       escitalopram (LEXAPRO) 5 MG tablet Take 5 mg by mouth daily 2000       fluticasone (FLOVENT DISKUS) 50 MCG/BLIST inhaler Inhale 1 puff into the lungs every 12 hours 0800, 2000       furosemide (LASIX) 20 MG tablet Take 20 mg by mouth every other day       ipratropium - albuterol 0.5 mg/2.5 mg/3 mL (DUONEB) 0.5-2.5 (3) MG/3ML neb solution Take 1 vial by nebulization 4 times daily       latanoprost (XALATAN) 0.005 % ophthalmic solution Place 1 drop into both eyes At Bedtime 2000       lovastatin  "(MEVACOR) 20 MG tablet Take 20 mg by mouth every evening 1500       nitroGLYcerin (NITROSTAT) 0.4 MG sublingual tablet Place 0.4 mg under the tongue every 5 minutes as needed for chest pain For chest pain place 1 tablet under the tongue every 5 minutes for 3 doses. If symptoms persist 5 minutes after 1st dose call 911.       potassium chloride ER (K-DUR/KLOR-CON M) 10 MEQ CR tablet Take 20 mEq by mouth every other day Take with lasix       senna-docusate (SENOKOT-S/PERICOLACE) 8.6-50 MG tablet Take 1 tablet by mouth 2 times daily as needed for constipation         Medication Changes/Rationale:     Ceftin completed 5/3/2019    Prednisone taper completed 5/9/2019    Doxycycline for recurrent aspiration pneumonia 5/9-5/16/2019    Prednisone with taper 5/9-5/21/2019 for recurrent pneumonia    Lasix and KCl resumed 5/9/2019    Nitrostat prn chest pain    Controlled medications sent with patient:   not applicable/none     ROS:   4 point ROS including Respiratory, CV, GI and , other than that noted in the HPI,  is negative    Physical Exam:   Vitals: /77   Pulse 65   Temp 99.9  F (37.7  C)   Resp 14   Ht 1.6 m (5' 3\")   Wt 70.4 kg (155 lb 1.6 oz)   SpO2 95%   BMI 27.47 kg/m     BMI= Body mass index is 27.47 kg/m .   GENERAL APPEARANCE:  alert,  in no acute distress, frail   ENT:  Mouth and posterior oropharynx normal, dry  mucous membranes, [place]  EYES:  EOM normal, conjunctiva and lids normal, PERRL  NECK:  No adenopathy,masses or thyromegaly  RESP:  respiratory effort and palpation of chest normal, no respiratory distress, diminished breath sounds bilaterally, few crackles   CV:  Palpation and auscultation of heart done , regular rate and rhythm, no murmur, 1+  edema  ABDOMEN:  soft, nontender, no hepatosplenomegaly or other masses  M/S:   wheelchair. ALONSO with good strength. No joint inflammation  SKIN:  no rashes or open areas  PSYCH:  oriented to self,situation, insight and judgement impaired, memory " impaired     SNF labs:  Lab Results   Component Value Date    WBC 10.0 05/13/2019     Lab Results   Component Value Date    RBC 3.75 05/13/2019     Lab Results   Component Value Date    HGB 11.1 05/13/2019     Lab Results   Component Value Date    HCT 34.1 05/13/2019     Lab Results   Component Value Date    MCV 91 05/13/2019     Lab Results   Component Value Date    MCH 29.6 05/13/2019     Lab Results   Component Value Date    MCHC 32.6 05/13/2019     Lab Results   Component Value Date    RDW 16.6 05/13/2019     Lab Results   Component Value Date     05/13/2019     Last Comprehensive Metabolic Panel:  Sodium   Date Value Ref Range Status   05/24/2019 140 133 - 144 mmol/L Final     Potassium   Date Value Ref Range Status   05/24/2019 3.8 3.4 - 5.3 mmol/L Final     Chloride   Date Value Ref Range Status   05/24/2019 105 94 - 109 mmol/L Final     Carbon Dioxide   Date Value Ref Range Status   05/24/2019 32 20 - 32 mmol/L Final     Anion Gap   Date Value Ref Range Status   05/24/2019 3 3 - 14 mmol/L Final     Glucose   Date Value Ref Range Status   05/24/2019 96 70 - 99 mg/dL Final     Urea Nitrogen   Date Value Ref Range Status   05/24/2019 40 (H) 7 - 30 mg/dL Final     Creatinine   Date Value Ref Range Status   05/24/2019 1.25 (H) 0.52 - 1.04 mg/dL Final     GFR Estimate   Date Value Ref Range Status   05/24/2019 37 (L) >60 mL/min/[1.73_m2] Final     Comment:     Non  GFR Calc  Starting 12/18/2018, serum creatinine based estimated GFR (eGFR) will be   calculated using the Chronic Kidney Disease Epidemiology Collaboration   (CKD-EPI) equation.       Calcium   Date Value Ref Range Status   05/24/2019 8.4 (L) 8.5 - 10.1 mg/dL Final       DISCHARGE PLAN:    Follow up labs: None     Medical Follow Up:      Follow up with hospice care    MTM referral needed and placed by this provider: No     Discharge Services: Home Care:  Hospice and From:  hospice services through [facility]      TOTAL DISCHARGE  TIME:   Greater than 30 minutes  Electronically signed by:  JAKE Fraire CNP           Sincerely,        JAKE Fraire CNP

## 2019-07-01 DIAGNOSIS — J18.9 COMMUNITY ACQUIRED PNEUMONIA, UNSPECIFIED LATERALITY: Primary | ICD-10-CM

## 2020-01-22 ENCOUNTER — RECORDS - HEALTHEAST (OUTPATIENT)
Dept: LAB | Facility: CLINIC | Age: 85
End: 2020-01-22

## 2020-01-22 LAB
ANION GAP SERPL CALCULATED.3IONS-SCNC: 11 MMOL/L (ref 5–18)
BASOPHILS # BLD AUTO: 0.1 THOU/UL (ref 0–0.2)
BASOPHILS NFR BLD AUTO: 0 % (ref 0–2)
BUN SERPL-MCNC: 29 MG/DL (ref 8–28)
CALCIUM SERPL-MCNC: 9.5 MG/DL (ref 8.5–10.5)
CHLORIDE BLD-SCNC: 101 MMOL/L (ref 98–107)
CO2 SERPL-SCNC: 23 MMOL/L (ref 22–31)
CREAT SERPL-MCNC: 1.31 MG/DL (ref 0.6–1.1)
EOSINOPHIL # BLD AUTO: 0.1 THOU/UL (ref 0–0.4)
EOSINOPHIL NFR BLD AUTO: 1 % (ref 0–6)
ERYTHROCYTE [DISTWIDTH] IN BLOOD BY AUTOMATED COUNT: 14.6 % (ref 11–14.5)
GFR SERPL CREATININE-BSD FRML MDRD: 38 ML/MIN/1.73M2
GLUCOSE BLD-MCNC: 166 MG/DL (ref 70–125)
HCT VFR BLD AUTO: 42 % (ref 35–47)
HGB BLD-MCNC: 13.6 G/DL (ref 12–16)
LYMPHOCYTES # BLD AUTO: 1.6 THOU/UL (ref 0.8–4.4)
LYMPHOCYTES NFR BLD AUTO: 10 % (ref 20–40)
MCH RBC QN AUTO: 32 PG (ref 27–34)
MCHC RBC AUTO-ENTMCNC: 32.4 G/DL (ref 32–36)
MCV RBC AUTO: 99 FL (ref 80–100)
MONOCYTES # BLD AUTO: 1.2 THOU/UL (ref 0–0.9)
MONOCYTES NFR BLD AUTO: 7 % (ref 2–10)
NEUTROPHILS # BLD AUTO: 13.9 THOU/UL (ref 2–7.7)
NEUTROPHILS NFR BLD AUTO: 82 % (ref 50–70)
PLATELET # BLD AUTO: 181 THOU/UL (ref 140–440)
PMV BLD AUTO: 12.3 FL (ref 8.5–12.5)
POTASSIUM BLD-SCNC: 5.1 MMOL/L (ref 3.5–5)
RBC # BLD AUTO: 4.25 MILL/UL (ref 3.8–5.4)
SODIUM SERPL-SCNC: 135 MMOL/L (ref 136–145)
WBC: 17 THOU/UL (ref 4–11)